# Patient Record
Sex: FEMALE | Race: BLACK OR AFRICAN AMERICAN | NOT HISPANIC OR LATINO | Employment: OTHER | URBAN - METROPOLITAN AREA
[De-identification: names, ages, dates, MRNs, and addresses within clinical notes are randomized per-mention and may not be internally consistent; named-entity substitution may affect disease eponyms.]

---

## 2018-01-30 ENCOUNTER — OFFICE VISIT (OUTPATIENT)
Dept: OBSTETRICS AND GYNECOLOGY | Facility: CLINIC | Age: 73
End: 2018-01-30

## 2018-01-30 VITALS
SYSTOLIC BLOOD PRESSURE: 122 MMHG | HEIGHT: 63 IN | BODY MASS INDEX: 27.64 KG/M2 | WEIGHT: 156 LBS | DIASTOLIC BLOOD PRESSURE: 70 MMHG

## 2018-01-30 DIAGNOSIS — Z01.419 WELL WOMAN EXAM WITH ROUTINE GYNECOLOGICAL EXAM: Primary | ICD-10-CM

## 2018-01-30 PROCEDURE — 99397 PER PM REEVAL EST PAT 65+ YR: CPT | Performed by: OBSTETRICS & GYNECOLOGY

## 2018-01-30 NOTE — PROGRESS NOTES
Subjective   Max Ortiz is a 72 y.o. female is here today as a self referral for annual.    History of Present Illness-here today for annual exam and checkup.    The following portions of the patient's history were reviewed and updated as appropriate: allergies, current medications, past family history, past medical history, past social history, past surgical history and problem list.    Review of Systems   Constitutional: Negative.    HENT: Negative.    Eyes: Negative.    Respiratory: Negative.    Cardiovascular: Negative.    Gastrointestinal: Negative.    Endocrine: Negative.    Genitourinary: Negative.    Musculoskeletal: Negative.    Skin: Negative.    Allergic/Immunologic: Negative.    Neurological: Negative.    Hematological: Negative.    Psychiatric/Behavioral: Negative.        Objective   Physical Exam   Constitutional: She is oriented to person, place, and time. She appears well-developed and well-nourished.   HENT:   Head: Normocephalic and atraumatic.   Nose: Nose normal.   Eyes: Conjunctivae and EOM are normal. Pupils are equal, round, and reactive to light.   Neck: Normal range of motion. Neck supple.   Cardiovascular: Normal rate, regular rhythm and normal heart sounds.    Pulmonary/Chest: Effort normal and breath sounds normal. Right breast exhibits no inverted nipple, no mass, no nipple discharge, no skin change and no tenderness. Left breast exhibits no inverted nipple, no mass, no nipple discharge and no skin change. Breasts are symmetrical. There is no breast swelling.   Abdominal: Soft. Hernia confirmed negative in the right inguinal area and confirmed negative in the left inguinal area.   Genitourinary: Rectum normal. No breast tenderness, discharge or bleeding. Pelvic exam was performed with patient supine. No labial fusion. There is no rash, tenderness, lesion or injury on the right labia. There is no rash, tenderness, lesion or injury on the left labia. Right adnexum displays no  mass, no tenderness and no fullness. Left adnexum displays no mass, no tenderness and no fullness. No erythema or bleeding in the vagina. No foreign body in the vagina. No signs of injury around the vagina. No vaginal discharge found.   Neurological: She is alert and oriented to person, place, and time. She has normal reflexes.   Skin: Skin is warm and dry.   Psychiatric: She has a normal mood and affect. Her behavior is normal. Judgment and thought content normal.         Assessment/Plan   Problems Addressed this Visit     None      Visit Diagnoses     Well woman exam with routine gynecological exam    -  Primary        Mammogram done today.  Current with colonoscopy.  Did receive flu vaccine and pneumonia vaccine.

## 2018-09-25 ENCOUNTER — TREATMENT (OUTPATIENT)
Dept: PHYSICAL THERAPY | Facility: CLINIC | Age: 73
End: 2018-09-25

## 2018-09-25 DIAGNOSIS — G89.29 CHRONIC LEFT SHOULDER PAIN: Primary | ICD-10-CM

## 2018-09-25 DIAGNOSIS — M25.512 CHRONIC LEFT SHOULDER PAIN: Primary | ICD-10-CM

## 2018-09-25 PROCEDURE — 97161 PT EVAL LOW COMPLEX 20 MIN: CPT | Performed by: PHYSICAL THERAPIST

## 2018-09-25 PROCEDURE — G8984 CARRY CURRENT STATUS: HCPCS | Performed by: PHYSICAL THERAPIST

## 2018-09-25 PROCEDURE — 97110 THERAPEUTIC EXERCISES: CPT | Performed by: PHYSICAL THERAPIST

## 2018-09-25 PROCEDURE — 97140 MANUAL THERAPY 1/> REGIONS: CPT | Performed by: PHYSICAL THERAPIST

## 2018-09-25 PROCEDURE — G8985 CARRY GOAL STATUS: HCPCS | Performed by: PHYSICAL THERAPIST

## 2018-09-26 ENCOUNTER — TRANSCRIBE ORDERS (OUTPATIENT)
Dept: PHYSICAL THERAPY | Facility: CLINIC | Age: 73
End: 2018-09-26

## 2018-09-26 DIAGNOSIS — M25.512 LEFT SHOULDER PAIN, UNSPECIFIED CHRONICITY: Primary | ICD-10-CM

## 2018-09-28 ENCOUNTER — TREATMENT (OUTPATIENT)
Dept: PHYSICAL THERAPY | Facility: CLINIC | Age: 73
End: 2018-09-28

## 2018-09-28 DIAGNOSIS — M25.512 LEFT SHOULDER PAIN, UNSPECIFIED CHRONICITY: ICD-10-CM

## 2018-09-28 DIAGNOSIS — M25.512 CHRONIC LEFT SHOULDER PAIN: Primary | ICD-10-CM

## 2018-09-28 DIAGNOSIS — G89.29 CHRONIC LEFT SHOULDER PAIN: Primary | ICD-10-CM

## 2018-09-28 PROCEDURE — 97140 MANUAL THERAPY 1/> REGIONS: CPT | Performed by: PHYSICAL THERAPIST

## 2018-09-28 PROCEDURE — 97110 THERAPEUTIC EXERCISES: CPT | Performed by: PHYSICAL THERAPIST

## 2018-09-28 NOTE — PROGRESS NOTES
Physical Therapy Daily Progress Note            Subjective Evaluation    History of Present Illness    Subjective comment: Patient report that her shoulder is still achy but it does feel a little better and does not feel as weak.  The exercises are going okay so far.        Objective   See Exercise, Manual, and Modality Logs for complete treatment.       Assessment & Plan     Assessment  Assessment details: Patient tolerated treatment very well. Improved passive ROM in left shoulder to 110 degrees flexion and 70 degrees ER.      Plan  Plan details: Progress as tolerated.                      Manual Therapy:    10     mins  50482;  Therapeutic Exercise:    40     mins  49840;       Timed Treatment:   50   mins   Total Treatment:     60   mins    Raysa Arredondo, PT  Physical Therapist

## 2018-10-02 ENCOUNTER — TREATMENT (OUTPATIENT)
Dept: PHYSICAL THERAPY | Facility: CLINIC | Age: 73
End: 2018-10-02

## 2018-10-02 DIAGNOSIS — G89.29 CHRONIC LEFT SHOULDER PAIN: Primary | ICD-10-CM

## 2018-10-02 DIAGNOSIS — M25.512 CHRONIC LEFT SHOULDER PAIN: Primary | ICD-10-CM

## 2018-10-02 PROCEDURE — 97110 THERAPEUTIC EXERCISES: CPT | Performed by: PHYSICAL THERAPIST

## 2018-10-02 PROCEDURE — 97140 MANUAL THERAPY 1/> REGIONS: CPT | Performed by: PHYSICAL THERAPIST

## 2018-10-02 NOTE — PROGRESS NOTES
Physical Therapy Daily Progress Note            Subjective Evaluation    History of Present Illness    Subjective comment: Patient reports that she is feeling better all the time and the exercises are going well at home. She states that she has more AROM of left shoudler and her arm does not feel as tired.        Objective   See Exercise, Manual, and Modality Logs for complete treatment.       Assessment & Plan     Assessment  Assessment details: Patient tolerated treatment well with improved AROM/PROM.  PROM to near  normal limites.  Active ROM  Limited to 90 degrees.  Still has upper trap tightness with elevated scapula.     Plan  Plan details: Progress rom and stength                     Manual Therapy:    10     mins  65051;  Therapeutic Exercise:    30     mins  32704;         Timed Treatment:   40   mins   Total Treatment:     40   mins    Raysa Arredondo, PT  Physical Therapist

## 2018-10-05 ENCOUNTER — TREATMENT (OUTPATIENT)
Dept: PHYSICAL THERAPY | Facility: CLINIC | Age: 73
End: 2018-10-05

## 2018-10-05 DIAGNOSIS — M25.512 CHRONIC LEFT SHOULDER PAIN: Primary | ICD-10-CM

## 2018-10-05 DIAGNOSIS — G89.29 CHRONIC LEFT SHOULDER PAIN: Primary | ICD-10-CM

## 2018-10-05 DIAGNOSIS — M25.512 LEFT SHOULDER PAIN, UNSPECIFIED CHRONICITY: ICD-10-CM

## 2018-10-05 PROCEDURE — 97110 THERAPEUTIC EXERCISES: CPT | Performed by: PHYSICAL THERAPIST

## 2018-10-05 PROCEDURE — 97140 MANUAL THERAPY 1/> REGIONS: CPT | Performed by: PHYSICAL THERAPIST

## 2018-10-05 NOTE — PROGRESS NOTES
Physical Therapy Daily Progress Note            Subjective Evaluation    History of Present Illness    Subjective comment: Patient reports that she is feeling approxinatly 75% better.  Is able to perform reaching activities around the house with greater ease.  She does not have as much weakness as she was having before.        Objective   See Exercise, Manual, and Modality Logs for complete treatment.       Assessment & Plan     Assessment  Assessment details: Patient tolerated treatment well.  She continues to show improvement with Left shoulder AROM.  She has 125 degrees flexion and 100 degrees abduction . She continues to have increased tightness along upper trap and levator as well as pectoral tightness.     Plan  Plan details: Progress one time a week for 2 weeks and then reasses.                      Manual Therapy:    8     mins  56397;  Therapeutic Exercise:    45     mins  08120;       Timed Treatment:   53   mins   Total Treatment:     53   mins    Raysa Arredondo, PT  Physical Therapist

## 2018-10-10 ENCOUNTER — TREATMENT (OUTPATIENT)
Dept: PHYSICAL THERAPY | Facility: CLINIC | Age: 73
End: 2018-10-10

## 2018-10-10 DIAGNOSIS — M25.512 CHRONIC LEFT SHOULDER PAIN: Primary | ICD-10-CM

## 2018-10-10 DIAGNOSIS — M25.512 LEFT SHOULDER PAIN, UNSPECIFIED CHRONICITY: ICD-10-CM

## 2018-10-10 DIAGNOSIS — G89.29 CHRONIC LEFT SHOULDER PAIN: Primary | ICD-10-CM

## 2018-10-10 PROCEDURE — 97140 MANUAL THERAPY 1/> REGIONS: CPT | Performed by: PHYSICAL THERAPIST

## 2018-10-10 PROCEDURE — 97110 THERAPEUTIC EXERCISES: CPT | Performed by: PHYSICAL THERAPIST

## 2018-10-10 NOTE — PROGRESS NOTES
Physical Therapy Daily Progress Note      Visit # 5      Subjective Evaluation    History of Present Illness    Subjective comment: Patient reports that her shoulder is feeling better.  has some increased pain in the right shoulder today.        Objective   See Exercise, Manual, and Modality Logs for complete treatment.       Assessment & Plan     Assessment  Assessment details: Patient tolerated treatment well.  She continues to have improved AROM/PROM and strength for left shoulder however still has very end range limitations which compromise reaching overhead.                      Manual Therapy:    10     mins  84323;  Therapeutic Exercise:    40     mins  82903;       Timed Treatment:   50   mins   Total Treatment:     50   mins    Raysa Arredondo, PT  Physical Therapist

## 2018-10-17 ENCOUNTER — TREATMENT (OUTPATIENT)
Dept: PHYSICAL THERAPY | Facility: CLINIC | Age: 73
End: 2018-10-17

## 2018-10-17 DIAGNOSIS — G89.29 CHRONIC LEFT SHOULDER PAIN: Primary | ICD-10-CM

## 2018-10-17 DIAGNOSIS — M25.512 CHRONIC LEFT SHOULDER PAIN: Primary | ICD-10-CM

## 2018-10-17 PROCEDURE — 97110 THERAPEUTIC EXERCISES: CPT | Performed by: PHYSICAL THERAPIST

## 2018-10-17 NOTE — PROGRESS NOTES
Physical Therapy Daily Progress Note      Visit # 6      Subjective Evaluation    History of Present Illness    Subjective comment: Patient reports that she is feelign approximately 85% better.  Has returned to all ADL without difficulty.        Objective   See Exercise, Manual, and Modality Logs for complete treatment.       Assessment & Plan     Assessment  Assessment details: Patient has full AROM of left shoulder.  Full 4+/5-5/5 strength throughout.  Patient is independent with HEP and has met all established goals at this time. Discharge to [facility].                      Manual Therapy:    5     mins  30154;  Therapeutic Exercise:    45     mins  35946;       Timed Treatment:   50   mins   Total Treatment:     50   mins    Raysa Arredondo, PT  Physical Therapist

## 2019-02-19 ENCOUNTER — OFFICE VISIT (OUTPATIENT)
Dept: OBSTETRICS AND GYNECOLOGY | Facility: CLINIC | Age: 74
End: 2019-02-19

## 2019-02-19 VITALS
BODY MASS INDEX: 27.43 KG/M2 | WEIGHT: 154.8 LBS | HEIGHT: 63 IN | SYSTOLIC BLOOD PRESSURE: 122 MMHG | DIASTOLIC BLOOD PRESSURE: 67 MMHG

## 2019-02-19 DIAGNOSIS — Z00.00 ANNUAL PHYSICAL EXAM: Primary | ICD-10-CM

## 2019-02-19 LAB
DEVELOPER EXPIRATION DATE: NORMAL
DEVELOPER LOT NUMBER: NORMAL
EXPIRATION DATE: NORMAL
FECAL OCCULT BLOOD SCREEN, POC: NEGATIVE
Lab: NORMAL
NEGATIVE CONTROL: NEGATIVE
POSITIVE CONTROL: POSITIVE

## 2019-02-19 PROCEDURE — 99397 PER PM REEVAL EST PAT 65+ YR: CPT | Performed by: OBSTETRICS & GYNECOLOGY

## 2019-02-19 PROCEDURE — G0328 FECAL BLOOD SCRN IMMUNOASSAY: HCPCS | Performed by: OBSTETRICS & GYNECOLOGY

## 2019-02-19 NOTE — PROGRESS NOTES
HPI   Max Ortiz  is a 73 y.o. female who presents for a routine gynecologic exam.  She is without complaints today.  She denies hot flashes or night sweats.  Bowels and bladder are functioning normally.    Chief Complaint   Patient presents with   • Gynecologic Exam     Patient is here for a annual.       Past Medical History:   Diagnosis Date   • Allergic    • Arthritis    • Cataract    • Hypertension    • Osteoporosis        Past Surgical History:   Procedure Laterality Date   • BREAST CYST EXCISION     • HYSTERECTOMY         Social History     Socioeconomic History   • Marital status:      Spouse name: Not on file   • Number of children: Not on file   • Years of education: Not on file   • Highest education level: Not on file   Social Needs   • Financial resource strain: Not on file   • Food insecurity - worry: Not on file   • Food insecurity - inability: Not on file   • Transportation needs - medical: Not on file   • Transportation needs - non-medical: Not on file   Occupational History   • Not on file   Tobacco Use   • Smoking status: Never Smoker   Substance and Sexual Activity   • Alcohol use: No   • Drug use: No   • Sexual activity: Not on file   Other Topics Concern   • Not on file   Social History Narrative   • Not on file       The following portions of the patient's history were reviewed and updated as appropriate: allergies, current medications, past family history, past medical history, past social history, past surgical history and problem list.    Review of Systems this is negative for hot flashes or night sweats.  It is negative for mood swings.  It is negative for urinary or fecal incontinence.  All other systems are reviewed and are negative.          Physical Exam   Constitutional: She is oriented to person, place, and time. She appears well-developed and well-nourished.   HENT:   Head: Normocephalic and atraumatic.   Cardiovascular: Normal rate and regular rhythm.    Pulmonary/Chest: Effort normal and breath sounds normal. She has no wheezes. She has no rales.   The breasts are homogeneous.  There are no palpable lumps.  Nipple discharge and axillary adenopathy are absent   Abdominal: Soft. She exhibits no distension. There is no tenderness.   Genitourinary: Rectum normal and vagina normal. Rectal exam shows guaiac negative stool. There is no lesion on the right labia. There is no lesion on the left labia. No vaginal discharge found.   Genitourinary Comments: The vagina is atrophic.  There is excellent support.  FOBT is negative.   Neurological: She is alert and oriented to person, place, and time.   Skin: Skin is warm and dry.   Nursing note and vitals reviewed.      Assessment    Max was seen today for gynecologic exam.    Diagnoses and all orders for this visit:    Annual physical exam        Plan       Return in about 1 year (around 2/19/2020).    Social History     Tobacco Use   Smoking Status Never Smoker   Normal gynecologic exam  Counseled regarding the importance of regular screening mammograms.  The patient plans to schedule a mammogram soon.  Counseled regarding the importance of regular screening colonoscopies.  Last colonoscopy was normal 6 years ago.  FOBT is negative today.    1.

## 2019-02-27 ENCOUNTER — PROCEDURE VISIT (OUTPATIENT)
Dept: OBSTETRICS AND GYNECOLOGY | Facility: CLINIC | Age: 74
End: 2019-02-27

## 2019-02-27 ENCOUNTER — APPOINTMENT (OUTPATIENT)
Dept: WOMENS IMAGING | Facility: HOSPITAL | Age: 74
End: 2019-02-27

## 2019-02-27 DIAGNOSIS — Z12.31 VISIT FOR SCREENING MAMMOGRAM: Primary | ICD-10-CM

## 2019-02-27 PROCEDURE — 77067 SCR MAMMO BI INCL CAD: CPT | Performed by: OBSTETRICS & GYNECOLOGY

## 2019-02-27 PROCEDURE — 77067 SCR MAMMO BI INCL CAD: CPT | Performed by: RADIOLOGY

## 2020-02-23 ENCOUNTER — HOSPITAL ENCOUNTER (EMERGENCY)
Facility: HOSPITAL | Age: 75
Discharge: HOME | End: 2020-02-23
Attending: EMERGENCY MEDICINE | Admitting: EMERGENCY MEDICINE

## 2020-02-23 ENCOUNTER — APPOINTMENT (OUTPATIENT)
Dept: GENERAL RADIOLOGY | Facility: HOSPITAL | Age: 75
End: 2020-02-23

## 2020-02-23 VITALS
HEIGHT: 63 IN | WEIGHT: 148 LBS | OXYGEN SATURATION: 100 % | RESPIRATION RATE: 16 BRPM | DIASTOLIC BLOOD PRESSURE: 93 MMHG | HEART RATE: 106 BPM | TEMPERATURE: 98.9 F | SYSTOLIC BLOOD PRESSURE: 134 MMHG | BODY MASS INDEX: 26.22 KG/M2

## 2020-02-23 DIAGNOSIS — R07.89 ATYPICAL CHEST PAIN: Primary | ICD-10-CM

## 2020-02-23 DIAGNOSIS — M79.602 LEFT ARM PAIN: ICD-10-CM

## 2020-02-23 LAB
ALBUMIN SERPL-MCNC: 4.6 G/DL (ref 3.5–5.2)
ALBUMIN/GLOB SERPL: 1.4 G/DL
ALP SERPL-CCNC: 92 U/L (ref 39–117)
ALT SERPL W P-5'-P-CCNC: 12 U/L (ref 1–33)
ANION GAP SERPL CALCULATED.3IONS-SCNC: 13.8 MMOL/L (ref 5–15)
AST SERPL-CCNC: 14 U/L (ref 1–32)
BASOPHILS # BLD AUTO: 0.04 10*3/MM3 (ref 0–0.2)
BASOPHILS NFR BLD AUTO: 0.7 % (ref 0–1.5)
BILIRUB SERPL-MCNC: 0.4 MG/DL (ref 0.2–1.2)
BUN BLD-MCNC: 16 MG/DL (ref 8–23)
BUN/CREAT SERPL: 15.8 (ref 7–25)
CALCIUM SPEC-SCNC: 10.1 MG/DL (ref 8.6–10.5)
CHLORIDE SERPL-SCNC: 105 MMOL/L (ref 98–107)
CO2 SERPL-SCNC: 24.2 MMOL/L (ref 22–29)
CREAT BLD-MCNC: 1.01 MG/DL (ref 0.57–1)
DEPRECATED RDW RBC AUTO: 40.9 FL (ref 37–54)
EOSINOPHIL # BLD AUTO: 0.02 10*3/MM3 (ref 0–0.4)
EOSINOPHIL NFR BLD AUTO: 0.4 % (ref 0.3–6.2)
ERYTHROCYTE [DISTWIDTH] IN BLOOD BY AUTOMATED COUNT: 11.8 % (ref 12.3–15.4)
GFR SERPL CREATININE-BSD FRML MDRD: 65 ML/MIN/1.73
GLOBULIN UR ELPH-MCNC: 3.3 GM/DL
GLUCOSE BLD-MCNC: 103 MG/DL (ref 65–99)
HCT VFR BLD AUTO: 33.3 % (ref 34–46.6)
HGB BLD-MCNC: 10.7 G/DL (ref 12–15.9)
IMM GRANULOCYTES # BLD AUTO: 0.01 10*3/MM3 (ref 0–0.05)
IMM GRANULOCYTES NFR BLD AUTO: 0.2 % (ref 0–0.5)
LYMPHOCYTES # BLD AUTO: 1.21 10*3/MM3 (ref 0.7–3.1)
LYMPHOCYTES NFR BLD AUTO: 21.5 % (ref 19.6–45.3)
MCH RBC QN AUTO: 30.3 PG (ref 26.6–33)
MCHC RBC AUTO-ENTMCNC: 32.1 G/DL (ref 31.5–35.7)
MCV RBC AUTO: 94.3 FL (ref 79–97)
MONOCYTES # BLD AUTO: 0.47 10*3/MM3 (ref 0.1–0.9)
MONOCYTES NFR BLD AUTO: 8.3 % (ref 5–12)
NEUTROPHILS # BLD AUTO: 3.89 10*3/MM3 (ref 1.7–7)
NEUTROPHILS NFR BLD AUTO: 68.9 % (ref 42.7–76)
NRBC BLD AUTO-RTO: 0 /100 WBC (ref 0–0.2)
PLATELET # BLD AUTO: 229 10*3/MM3 (ref 140–450)
PMV BLD AUTO: 9.9 FL (ref 6–12)
POTASSIUM BLD-SCNC: 4 MMOL/L (ref 3.5–5.2)
PROT SERPL-MCNC: 7.9 G/DL (ref 6–8.5)
RBC # BLD AUTO: 3.53 10*6/MM3 (ref 3.77–5.28)
SODIUM BLD-SCNC: 143 MMOL/L (ref 136–145)
TROPONIN T SERPL-MCNC: <0.01 NG/ML (ref 0–0.03)
TROPONIN T SERPL-MCNC: <0.01 NG/ML (ref 0–0.03)
WBC NRBC COR # BLD: 5.64 10*3/MM3 (ref 3.4–10.8)

## 2020-02-23 PROCEDURE — 84484 ASSAY OF TROPONIN QUANT: CPT | Performed by: NURSE PRACTITIONER

## 2020-02-23 PROCEDURE — 99284 EMERGENCY DEPT VISIT MOD MDM: CPT

## 2020-02-23 PROCEDURE — 93010 ELECTROCARDIOGRAM REPORT: CPT | Performed by: INTERNAL MEDICINE

## 2020-02-23 PROCEDURE — 80053 COMPREHEN METABOLIC PANEL: CPT | Performed by: NURSE PRACTITIONER

## 2020-02-23 PROCEDURE — 85025 COMPLETE CBC W/AUTO DIFF WBC: CPT | Performed by: NURSE PRACTITIONER

## 2020-02-23 PROCEDURE — 71046 X-RAY EXAM CHEST 2 VIEWS: CPT

## 2020-02-23 PROCEDURE — 93005 ELECTROCARDIOGRAM TRACING: CPT | Performed by: NURSE PRACTITIONER

## 2020-02-23 RX ORDER — SODIUM CHLORIDE 0.9 % (FLUSH) 0.9 %
10 SYRINGE (ML) INJECTION AS NEEDED
Status: DISCONTINUED | OUTPATIENT
Start: 2020-02-23 | End: 2020-02-23 | Stop reason: HOSPADM

## 2020-02-23 NOTE — ED NOTES
"Pt to ER via EMS for left arm pain that started about one hour ago when she heard a \"pop\" in her house and then smelled gas. Pt reports she became anxious thinking there was a gas leak and that's when pain started. Pt denies shortness of breath or chest pain.      Ena Rader, RN  02/23/20 0948    "

## 2020-02-23 NOTE — ED PROVIDER NOTES
MD ATTESTATION NOTE    Patient presents to the ED complaining of episode of left arm pain starting after pt reports having a gas leak in her house, lasting 45 minutes. Also c/o anxiety during this episode.    Reviewed patient's lab and imaging studies, including negative acute EKG, XR Chest, CMP, and initial Troponin.     EKG          EKG time: 1125  Rhythm/Rate: NSR 77  P waves and VA: nml  QRS, axis: LAE   ST and T waves: nml     Interpreted Contemporaneously by me, independently viewed  No prior for comparison    On exam:    GENERAL: not distressed, awake, alert  CV: regular rhythm, regular rate  RESPIRATORY: normal effort  MUSCULOSKELETAL: no deformity, nml L radial pulse, no LUE edema  NEURO: alert, MATHUR, FC  SKIN: warm, dry    I agree with the plan to obtain second Troponin to rule out cardiac cause of pain then discharge pt home if negative.    The YULY and I have discussed this patient's history, physical exam, and treatment plan.  I have reviewed the documentation and personally had a face to face interaction with the patient. I affirm the documentation and agree with the treatment and plan.  The attached note describes my personal findings.    Documentation assistance provided by magdaleno Ac for Dr. Rodriguez.  Information recorded by the scribe was done at my direction and has been verified and validated by me.     Daly Ac  02/23/20 1218       Larry Rodriguez MD  02/23/20 5751

## 2020-02-23 NOTE — ED PROVIDER NOTES
"  EMERGENCY DEPARTMENT ENCOUNTER    CHIEF COMPLAINT  Chief Complaint: Arm pain  History given by: Patient  History limited by: None  Time Seen: 1019  Room Number: 31/31  PMD: Chintan Townsend MD      HPI:  Pt is a 74 y.o. female who presents with left arm pain that began earlier this morning around 0830 when the patient's home had a gas leak. Patient states her arm felt like it had a band around it. Patient states \"I don't know if I had a heart attack.\" Patient states she became excited and anxious when her arm pain began. Patient reports her arm pain has improved. Patient denies any chest pain lightheadedness, dizziness, coughing, or hx of smoking. Patient reports she has family in [place] she can stay with in the meantime while the gas leak is being fixed.   Past Medical History of HTN. Patient denies cardiac hx.     Duration: 0830 this morning  Timing: constant  Location: left arm  Radiation: none  Quality: band around my arm  Intensity/Severity: moderate  Progression: improved  Associated Symptoms: anxious  Aggravating Factors: none  Alleviating Factors: none  Previous Episodes: none  Treatment before arrival: no    PAST MEDICAL HISTORY  Active Ambulatory Problems     Diagnosis Date Noted   • No Active Ambulatory Problems     Resolved Ambulatory Problems     Diagnosis Date Noted   • No Resolved Ambulatory Problems     Past Medical History:   Diagnosis Date   • Allergic    • Arthritis    • Cataract    • Hypertension    • Osteoporosis        PAST SURGICAL HISTORY  Past Surgical History:   Procedure Laterality Date   • BREAST CYST EXCISION     • HYSTERECTOMY         FAMILY HISTORY  Family History   Problem Relation Age of Onset   • Hypertension Father    • Diabetes Father    • Colon cancer Father    • Alzheimer's disease Mother    • Hypertension Mother    • Heart disease Mother    • Colon cancer Paternal Aunt    • Breast cancer Cousin        SOCIAL HISTORY  Social History     Socioeconomic History   • Marital " status:      Spouse name: Not on file   • Number of children: Not on file   • Years of education: Not on file   • Highest education level: Not on file   Tobacco Use   • Smoking status: Never Smoker   Substance and Sexual Activity   • Alcohol use: No   • Drug use: No         ALLERGIES  Aspirin and Penicillins    REVIEW OF SYSTEMS  Review of Systems   Constitutional: Negative for chills and fever.   HENT: Negative for sore throat.    Respiratory: Negative for shortness of breath.    Cardiovascular: Negative for chest pain.   Gastrointestinal: Negative for nausea and vomiting.   Genitourinary: Negative for dysuria.   Musculoskeletal: Negative for back pain.        + arm pain   Skin: Negative for rash.   Neurological: Negative for dizziness.   Psychiatric/Behavioral: The patient is not nervous/anxious.        PHYSICAL EXAM  ED Triage Vitals   Temp Heart Rate Resp BP SpO2   02/23/20 1008 02/23/20 0948 02/23/20 0948 02/23/20 0948 02/23/20 0948   98.9 °F (37.2 °C) 106 16 153/82 100 %       Physical Exam   Constitutional: She is oriented to person, place, and time and well-developed, well-nourished, and in no distress.   HENT:   Head: Normocephalic and atraumatic.   Mouth/Throat: Mucous membranes are normal.   Eyes: No scleral icterus.   Neck: Normal range of motion.   Cardiovascular: Normal rate, regular rhythm and normal heart sounds.   Pulmonary/Chest: Effort normal and breath sounds normal.   Musculoskeletal: Normal range of motion.   Neurological: She is alert and oriented to person, place, and time.   Skin: Skin is warm and dry.   Psychiatric: Mood and affect normal.   Nursing note and vitals reviewed.      LAB RESULTS  Recent Results (from the past 24 hour(s))   Comprehensive Metabolic Panel    Collection Time: 02/23/20 10:53 AM   Result Value Ref Range    Glucose 103 (H) 65 - 99 mg/dL    BUN 16 8 - 23 mg/dL    Creatinine 1.01 (H) 0.57 - 1.00 mg/dL    Sodium 143 136 - 145 mmol/L    Potassium 4.0 3.5 - 5.2  mmol/L    Chloride 105 98 - 107 mmol/L    CO2 24.2 22.0 - 29.0 mmol/L    Calcium 10.1 8.6 - 10.5 mg/dL    Total Protein 7.9 6.0 - 8.5 g/dL    Albumin 4.60 3.50 - 5.20 g/dL    ALT (SGPT) 12 1 - 33 U/L    AST (SGOT) 14 1 - 32 U/L    Alkaline Phosphatase 92 39 - 117 U/L    Total Bilirubin 0.4 0.2 - 1.2 mg/dL    eGFR  African Amer 65 >60 mL/min/1.73    Globulin 3.3 gm/dL    A/G Ratio 1.4 g/dL    BUN/Creatinine Ratio 15.8 7.0 - 25.0    Anion Gap 13.8 5.0 - 15.0 mmol/L   Troponin    Collection Time: 02/23/20 10:53 AM   Result Value Ref Range    Troponin T <0.010 0.000 - 0.030 ng/mL   CBC Auto Differential    Collection Time: 02/23/20 10:53 AM   Result Value Ref Range    WBC 5.64 3.40 - 10.80 10*3/mm3    RBC 3.53 (L) 3.77 - 5.28 10*6/mm3    Hemoglobin 10.7 (L) 12.0 - 15.9 g/dL    Hematocrit 33.3 (L) 34.0 - 46.6 %    MCV 94.3 79.0 - 97.0 fL    MCH 30.3 26.6 - 33.0 pg    MCHC 32.1 31.5 - 35.7 g/dL    RDW 11.8 (L) 12.3 - 15.4 %    RDW-SD 40.9 37.0 - 54.0 fl    MPV 9.9 6.0 - 12.0 fL    Platelets 229 140 - 450 10*3/mm3    Neutrophil % 68.9 42.7 - 76.0 %    Lymphocyte % 21.5 19.6 - 45.3 %    Monocyte % 8.3 5.0 - 12.0 %    Eosinophil % 0.4 0.3 - 6.2 %    Basophil % 0.7 0.0 - 1.5 %    Immature Grans % 0.2 0.0 - 0.5 %    Neutrophils, Absolute 3.89 1.70 - 7.00 10*3/mm3    Lymphocytes, Absolute 1.21 0.70 - 3.10 10*3/mm3    Monocytes, Absolute 0.47 0.10 - 0.90 10*3/mm3    Eosinophils, Absolute 0.02 0.00 - 0.40 10*3/mm3    Basophils, Absolute 0.04 0.00 - 0.20 10*3/mm3    Immature Grans, Absolute 0.01 0.00 - 0.05 10*3/mm3    nRBC 0.0 0.0 - 0.2 /100 WBC   Troponin    Collection Time: 02/23/20 12:30 PM   Result Value Ref Range    Troponin T <0.010 0.000 - 0.030 ng/mL       I ordered the above labs and reviewed the results    RADIOLOGY  XR Chest 2 View   Final Result      Xr Chest 2 View    Result Date: 2/23/2020  Narrative: TWO-VIEW CHEST  HISTORY: Left-sided pain extending into arm.  FINDINGS:  The lungs are well-expanded and clear and  "the heart and hilar structures are normal. There is no acute disease.  This report was finalized on 2/23/2020 11:46 AM by Dr. Marco A Rader M.D.      I ordered the above noted radiological studies and reviewed the images on the PACS system.      EKG    ekg was interpreted by Dr. Rodriguez, see Dr. Rodriguez's note for interpretation.      PROGRESS AND CONSULTS     Reviewed pt's history and workup with Dr. Rodriguez.  At bedside evaluation, they agree with the plan of care.    Reviewed implications of results, diagnosis, meds, responsibility to follow up, warning signs and symptoms of possible worsening, potential complications and reasons to return to ER with patient.  Discussed all results and noted any abnormalities with patient.  Discussed absolute need to recheck abnormalities with PCP.    Discussed plan for discharge, as there is no emergent indication for admission.  Pt is agreeable and understands need for follow up and repeat testing.  Pt is aware that discharge does not mean that nothing is wrong but it indicates no emergency is present.  Pt is discharged with instructions to follow up with primary care doctor to have their blood pressure rechecked.       DIAGNOSIS  Final diagnoses:   Atypical chest pain   Left arm pain       FOLLOW UP   Chintan Townsend MD  [address] [address]  [phone]          COURSE & MEDICAL DECISION MAKING  Pertinent Labs and Imaging studies that were ordered and reviewed are noted above.  Results were reviewed/discussed with the patient and they were also made aware of online assess.   Pt also made aware that some labs, such as cultures, will not be resulted during ER visit and follow up with PMD is necessary.     MEDICATIONS GIVEN IN ER  Medications   sodium chloride 0.9 % flush 10 mL (has no administration in time range)       /82   Pulse 106   Temp 98.9 °F (37.2 °C)   Resp 16   Ht 160 cm (63\")   Wt 67.1 kg (148 lb)   SpO2 100%   BMI 26.22 kg/m² "       I personally reviewed the past medical history, past surgical history, social history, family history, current medications and allergies as they appear in this chart.  The scribe's note accurately reflects the work and decisions made by me.     Documentation assistance provided by magdaleno Carroll for DC Egan on 2/23/2020 at 1:13 PM. Information recorded by the scribe was done at my direction and has been verified and validated by me.       Sabrina Carroll  02/23/20 8635       Katharina Devine, DC  02/23/20 9596

## 2020-02-23 NOTE — DISCHARGE INSTRUCTIONS
Continue current home medications  Follow-up with your PMD next week as needed  Return to the ER for fever, chills, chest pain, shortness of breath or any new or worsening symptoms

## 2020-02-28 ENCOUNTER — PROCEDURE VISIT (OUTPATIENT)
Dept: OBSTETRICS AND GYNECOLOGY | Facility: CLINIC | Age: 75
End: 2020-02-28

## 2020-02-28 ENCOUNTER — APPOINTMENT (OUTPATIENT)
Dept: WOMENS IMAGING | Facility: HOSPITAL | Age: 75
End: 2020-02-28

## 2020-02-28 DIAGNOSIS — Z12.31 VISIT FOR SCREENING MAMMOGRAM: Primary | ICD-10-CM

## 2020-02-28 DIAGNOSIS — Z78.0 POST-MENOPAUSAL: Primary | ICD-10-CM

## 2020-02-28 PROCEDURE — 77067 SCR MAMMO BI INCL CAD: CPT | Performed by: RADIOLOGY

## 2020-02-28 PROCEDURE — 77067 SCR MAMMO BI INCL CAD: CPT | Performed by: OBSTETRICS & GYNECOLOGY

## 2020-02-28 PROCEDURE — 77063 BREAST TOMOSYNTHESIS BI: CPT | Performed by: RADIOLOGY

## 2020-02-28 PROCEDURE — 77063 BREAST TOMOSYNTHESIS BI: CPT | Performed by: OBSTETRICS & GYNECOLOGY

## 2020-06-10 ENCOUNTER — APPOINTMENT (OUTPATIENT)
Dept: WOMENS IMAGING | Facility: HOSPITAL | Age: 75
End: 2020-06-10

## 2020-06-10 PROCEDURE — 77080 DXA BONE DENSITY AXIAL: CPT | Performed by: RADIOLOGY

## 2020-06-17 DIAGNOSIS — Z78.0 POST-MENOPAUSAL: ICD-10-CM

## 2020-08-26 DIAGNOSIS — I10 ESSENTIAL HYPERTENSION, BENIGN: Primary | ICD-10-CM

## 2020-08-26 NOTE — TELEPHONE ENCOUNTER
Caller: Max Wasserman    Relationship: Self    Best call back number: [phone]    Medication needed:   Requested Prescriptions     Pending Prescriptions Disp Refills   • hydroCHLOROthiazide (HYDRODIURIL) 25 MG tablet       Si tablet.       When do you need the refill by: ASAP    What details did the patient provide when requesting the medication: PT STATES SHE WILL BE OUT ON  FRIDAY    Does the patient have less than a 3 day supply:  [] Yes  [x] No    What is the patient's preferred pharmacy: [address] ([facility] [address] [facility] [phone] [facility] [phone]

## 2020-08-27 DIAGNOSIS — I10 ESSENTIAL HYPERTENSION, BENIGN: Primary | ICD-10-CM

## 2020-08-27 RX ORDER — HYDRALAZINE HYDROCHLORIDE 25 MG/1
25 TABLET, FILM COATED ORAL 2 TIMES DAILY
Qty: 60 TABLET | Refills: 0 | Status: SHIPPED | OUTPATIENT
Start: 2020-08-27 | End: 2021-09-21 | Stop reason: SDUPTHER

## 2020-08-27 RX ORDER — HYDROCHLOROTHIAZIDE 25 MG/1
25 TABLET ORAL DAILY
Qty: 30 TABLET | Refills: 1 | Status: SHIPPED | OUTPATIENT
Start: 2020-08-27 | End: 2020-09-16

## 2020-09-16 ENCOUNTER — OFFICE VISIT (OUTPATIENT)
Dept: FAMILY MEDICINE CLINIC | Facility: CLINIC | Age: 75
End: 2020-09-16

## 2020-09-16 VITALS
DIASTOLIC BLOOD PRESSURE: 88 MMHG | HEART RATE: 85 BPM | SYSTOLIC BLOOD PRESSURE: 160 MMHG | HEIGHT: 63 IN | WEIGHT: 145.4 LBS | BODY MASS INDEX: 25.76 KG/M2 | TEMPERATURE: 98.4 F | RESPIRATION RATE: 16 BRPM | OXYGEN SATURATION: 100 %

## 2020-09-16 DIAGNOSIS — M75.82 ROTATOR CUFF TENDINITIS, LEFT: ICD-10-CM

## 2020-09-16 DIAGNOSIS — I10 ESSENTIAL HYPERTENSION, BENIGN: Primary | ICD-10-CM

## 2020-09-16 LAB
ALBUMIN SERPL-MCNC: 4.8 G/DL (ref 3.5–5.2)
ALBUMIN/GLOB SERPL: 1.7 G/DL
ALP SERPL-CCNC: 98 U/L (ref 39–117)
ALT SERPL-CCNC: 15 U/L (ref 1–33)
AST SERPL-CCNC: 15 U/L (ref 1–32)
BILIRUB SERPL-MCNC: 0.3 MG/DL (ref 0–1.2)
BUN SERPL-MCNC: 16 MG/DL (ref 8–23)
BUN/CREAT SERPL: 14 (ref 7–25)
CALCIUM SERPL-MCNC: 10.1 MG/DL (ref 8.6–10.5)
CHLORIDE SERPL-SCNC: 102 MMOL/L (ref 98–107)
CO2 SERPL-SCNC: 28.4 MMOL/L (ref 22–29)
CREAT SERPL-MCNC: 1.14 MG/DL (ref 0.57–1)
ERYTHROCYTE [DISTWIDTH] IN BLOOD BY AUTOMATED COUNT: 11.6 % (ref 12.3–15.4)
GLOBULIN SER CALC-MCNC: 2.8 GM/DL
GLUCOSE SERPL-MCNC: 104 MG/DL (ref 65–99)
HCT VFR BLD AUTO: 32.8 % (ref 34–46.6)
HGB BLD-MCNC: 10.9 G/DL (ref 12–15.9)
MCH RBC QN AUTO: 30.5 PG (ref 26.6–33)
MCHC RBC AUTO-ENTMCNC: 33.2 G/DL (ref 31.5–35.7)
MCV RBC AUTO: 91.9 FL (ref 79–97)
PLATELET # BLD AUTO: 245 10*3/MM3 (ref 140–450)
POTASSIUM SERPL-SCNC: 4 MMOL/L (ref 3.5–5.2)
PROT SERPL-MCNC: 7.6 G/DL (ref 6–8.5)
RBC # BLD AUTO: 3.57 10*6/MM3 (ref 3.77–5.28)
SODIUM SERPL-SCNC: 141 MMOL/L (ref 136–145)
WBC # BLD AUTO: 6.02 10*3/MM3 (ref 3.4–10.8)

## 2020-09-16 PROCEDURE — 99204 OFFICE O/P NEW MOD 45 MIN: CPT | Performed by: INTERNAL MEDICINE

## 2020-09-16 RX ORDER — TRIPROLIDINE/PSEUDOEPHEDRINE 2.5MG-60MG
1 TABLET ORAL 4 TIMES DAILY
COMMUNITY
End: 2021-11-08

## 2020-09-16 NOTE — PROGRESS NOTES
09/16/2020    CC: Hypertension  .        HPI  Hypertension  This is a chronic problem. The problem has been gradually improving since onset. The problem is controlled.        Subjective   Max Wasserman is a 74 y.o. female.      The following portions of the patient's history were reviewed and updated as appropriate: allergies, current medications, past family history, past medical history, past social history, past surgical history and problem list.    Problem List  There is no problem list on file for this patient.      Past Medical History  Past Medical History:   Diagnosis Date   • Allergic    • Arthritis    • Cataract    • Hypertension    • Osteoporosis        Surgical History  Past Surgical History:   Procedure Laterality Date   • BREAST CYST EXCISION     • HYSTERECTOMY         Family History  Family History   Problem Relation Age of Onset   • Hypertension Father    • Diabetes Father    • Colon cancer Father    • Alzheimer's disease Mother    • Hypertension Mother    • Heart disease Mother    • Colon cancer Paternal Aunt    • Breast cancer Cousin        Social History  Social History    Tobacco Use      Smoking status: Never Smoker      Smokeless tobacco: Never Used       Is the Patient a current tobacco user? No    Allergies  Allergies   Allergen Reactions   • Aspirin Other (See Comments)     UPSETS STOMACH   • Penicillins Other (See Comments)     RED AND SWOLLEN AT INJECTION SITE       Current Medications    Current Outpatient Medications:   •  amLODIPine (NORVASC) 10 MG tablet, Take 10 mg by mouth., Disp: , Rfl:   •  difluprednate (Durezol) 0.05 % ophthalmic emulsion, 1 drop 4 (Four) Times a Day., Disp: , Rfl:   •  hydrALAZINE (APRESOLINE) 25 MG tablet, Take 1 tablet by mouth 2 (Two) Times a Day., Disp: 60 tablet, Rfl: 0  •  lisinopril (PRINIVIL,ZESTRIL) 40 MG tablet, Take 40 mg by mouth., Disp: , Rfl:   •  hydroCHLOROthiazide (HYDRODIURIL) 25 MG tablet, Take 1 tablet by mouth Daily., Disp: 30 tablet,  Rfl: 1     Review of System  Review of Systems   Constitutional: Negative.    HENT: Negative.    Eyes: Negative.    Respiratory: Negative.    Gastrointestinal: Negative.    Musculoskeletal: Negative.    Skin: Negative.      I have reviewed and confirmed the accuracy of the ROS as documented by the MA/LPN/RN Chintan Townsend MD    Vitals:    09/16/20 1013   BP: 160/88   Pulse: 85   Resp: 16   Temp: 98.4 °F (36.9 °C)   SpO2: 100%     Body mass index is 26.17 kg/m².    Objective     No visits with results within 30 Day(s) from this visit.   Latest known visit with results is:   Admission on 02/23/2020, Discharged on 02/23/2020   Component Date Value Ref Range Status   • Glucose 02/23/2020 103* 65 - 99 mg/dL Final   • BUN 02/23/2020 16  8 - 23 mg/dL Final   • Creatinine 02/23/2020 1.01* 0.57 - 1.00 mg/dL Final   • Sodium 02/23/2020 143  136 - 145 mmol/L Final   • Potassium 02/23/2020 4.0  3.5 - 5.2 mmol/L Final   • Chloride 02/23/2020 105  98 - 107 mmol/L Final   • CO2 02/23/2020 24.2  22.0 - 29.0 mmol/L Final   • Calcium 02/23/2020 10.1  8.6 - 10.5 mg/dL Final   • Total Protein 02/23/2020 7.9  6.0 - 8.5 g/dL Final   • Albumin 02/23/2020 4.60  3.50 - 5.20 g/dL Final   • ALT (SGPT) 02/23/2020 12  1 - 33 U/L Final   • AST (SGOT) 02/23/2020 14  1 - 32 U/L Final   • Alkaline Phosphatase 02/23/2020 92  39 - 117 U/L Final   • Total Bilirubin 02/23/2020 0.4  0.2 - 1.2 mg/dL Final   • eGFR   Amer 02/23/2020 65  >60 mL/min/1.73 Final   • Globulin 02/23/2020 3.3  gm/dL Final   • A/G Ratio 02/23/2020 1.4  g/dL Final   • BUN/Creatinine Ratio 02/23/2020 15.8  7.0 - 25.0 Final   • Anion Gap 02/23/2020 13.8  5.0 - 15.0 mmol/L Final   • Troponin T 02/23/2020 <0.010  0.000 - 0.030 ng/mL Final   • WBC 02/23/2020 5.64  3.40 - 10.80 10*3/mm3 Final   • RBC 02/23/2020 3.53* 3.77 - 5.28 10*6/mm3 Final   • Hemoglobin 02/23/2020 10.7* 12.0 - 15.9 g/dL Final   • Hematocrit 02/23/2020 33.3* 34.0 - 46.6 % Final   • MCV 02/23/2020 94.3   79.0 - 97.0 fL Final   • MCH 02/23/2020 30.3  26.6 - 33.0 pg Final   • MCHC 02/23/2020 32.1  31.5 - 35.7 g/dL Final   • RDW 02/23/2020 11.8* 12.3 - 15.4 % Final   • RDW-SD 02/23/2020 40.9  37.0 - 54.0 fl Final   • MPV 02/23/2020 9.9  6.0 - 12.0 fL Final   • Platelets 02/23/2020 229  140 - 450 10*3/mm3 Final   • Neutrophil % 02/23/2020 68.9  42.7 - 76.0 % Final   • Lymphocyte % 02/23/2020 21.5  19.6 - 45.3 % Final   • Monocyte % 02/23/2020 8.3  5.0 - 12.0 % Final   • Eosinophil % 02/23/2020 0.4  0.3 - 6.2 % Final   • Basophil % 02/23/2020 0.7  0.0 - 1.5 % Final   • Immature Grans % 02/23/2020 0.2  0.0 - 0.5 % Final   • Neutrophils, Absolute 02/23/2020 3.89  1.70 - 7.00 10*3/mm3 Final   • Lymphocytes, Absolute 02/23/2020 1.21  0.70 - 3.10 10*3/mm3 Final   • Monocytes, Absolute 02/23/2020 0.47  0.10 - 0.90 10*3/mm3 Final   • Eosinophils, Absolute 02/23/2020 0.02  0.00 - 0.40 10*3/mm3 Final   • Basophils, Absolute 02/23/2020 0.04  0.00 - 0.20 10*3/mm3 Final   • Immature Grans, Absolute 02/23/2020 0.01  0.00 - 0.05 10*3/mm3 Final   • nRBC 02/23/2020 0.0  0.0 - 0.2 /100 WBC Final   • Troponin T 02/23/2020 <0.010  0.000 - 0.030 ng/mL Final       Physical Exam  Physical Exam  Vitals signs and nursing note reviewed.   Constitutional:       Appearance: She is well-developed.   Eyes:      Conjunctiva/sclera: Conjunctivae normal.   Neck:      Musculoskeletal: Normal range of motion and neck supple.   Cardiovascular:      Rate and Rhythm: Normal rate and regular rhythm.      Heart sounds: Normal heart sounds.   Pulmonary:      Effort: Pulmonary effort is normal.   Abdominal:      General: Bowel sounds are normal.      Palpations: Abdomen is soft.   Skin:     General: Skin is warm and dry.   Neurological:      Mental Status: She is alert and oriented to person, place, and time.   Psychiatric:         Behavior: Behavior normal.         Assessment/Plan      This patient then lost to follow-up for about 5 months.  This was secondary  to the Covid 19 pandemic.  She relates that upon her last refill she was given HCTZ she knew that we discontinue this in the past and refused to take it.  She relates that she is feeling fine she's had no problems in the interim of visits.    Recheck of her blood pressure by me in the left arm sitting position standard cuff was 1 30/4/80. She denies any swelling in the lower extremities.n patient's blood pressures been difficult to control but it is holding at this point on 3 agents.      Patient's range of motion in left shoulder is within normal limits at this point.  The rotator cuff tendinitis she had previously has pretty much resolved.  Patient wrote relates that she is able to perform most of her usual activities without any deficit.    Max was seen today for hypertension.    Diagnoses and all orders for this visit:    Essential hypertension, benign: Controlled  -     Comprehensive metabolic panel  -     CBC No Differential    Rotator cuff tendinitis, left: Stable      Plan:  #1 per) continue current medication  #2.)  Follow-up in 5-6 months for a Medicare wellness review  #3.) comprehensive metabolic panel  #4.)  Patient is instructed not to take HCTZ       Chintan Townsend MD  09/16/2020

## 2020-09-29 ENCOUNTER — OFFICE VISIT (OUTPATIENT)
Dept: OBSTETRICS AND GYNECOLOGY | Facility: CLINIC | Age: 75
End: 2020-09-29

## 2020-09-29 VITALS — BODY MASS INDEX: 27.2 KG/M2 | HEIGHT: 62 IN | WEIGHT: 147.8 LBS

## 2020-09-29 DIAGNOSIS — M85.80 OSTEOPENIA, UNSPECIFIED LOCATION: Primary | ICD-10-CM

## 2020-09-29 PROCEDURE — 99212 OFFICE O/P EST SF 10 MIN: CPT | Performed by: OBSTETRICS & GYNECOLOGY

## 2020-09-29 NOTE — PROGRESS NOTES
HPI   Max Shirin Bonds  is a 74 y.o. female who presents to discuss her bone density.  She had a DEXA in February of this year.  I have reviewed it and discussed it with the patient.  It shows a T score of -1.3 at the spine.  There is an overall T score of -0.9 at the hip, with focal areas of -1.3 at the femoral neck.  The patient does not take calcium.  She does take vitamin D and does weightbearing exercise.    Chief Complaint   Patient presents with   • Follow-up     Patient is here for a f/u to discuss bone density results.       Past Medical History:   Diagnosis Date   • Allergic    • Arthritis    • Cataract    • Hypertension    • Osteoporosis        Past Surgical History:   Procedure Laterality Date   • BREAST CYST EXCISION     • HYSTERECTOMY         Social History     Socioeconomic History   • Marital status:      Spouse name: Not on file   • Number of children: Not on file   • Years of education: Not on file   • Highest education level: Not on file   Tobacco Use   • Smoking status: Never Smoker   • Smokeless tobacco: Never Used   Substance and Sexual Activity   • Alcohol use: No   • Drug use: No   • Sexual activity: Defer       The following portions of the patient's history were reviewed and updated as appropriate: allergies, current medications, past family history, past medical history, past social history, past surgical history and problem list.    Review of Systems          Physical Exam  Vitals signs and nursing note reviewed.   Neurological:      Mental Status: She is alert and oriented to person, place, and time.         Assessment    Max was seen today for follow-up.    Diagnoses and all orders for this visit:    Osteopenia, unspecified location        Plan  Counseling visit regarding osteopenia.  We discussed the pathophysiology of this condition and I answered the patient's questions.  We discussed options for management.  At this time, prescription medications are not indicated.  The  patient will continue regular, weightbearing exercise.  She will continue taking vitamin D.  I recommend a repeat DEXA in 2 years.  1. No follow-ups on file.    Social History     Tobacco Use   Smoking Status Never Smoker   2.

## 2020-11-23 ENCOUNTER — OFFICE VISIT (OUTPATIENT)
Dept: FAMILY MEDICINE CLINIC | Facility: CLINIC | Age: 75
End: 2020-11-23

## 2020-11-23 VITALS
HEART RATE: 65 BPM | HEIGHT: 62 IN | SYSTOLIC BLOOD PRESSURE: 120 MMHG | DIASTOLIC BLOOD PRESSURE: 80 MMHG | BODY MASS INDEX: 26.65 KG/M2 | TEMPERATURE: 98 F | OXYGEN SATURATION: 99 % | WEIGHT: 144.8 LBS

## 2020-11-23 DIAGNOSIS — N18.31 STAGE 3A CHRONIC KIDNEY DISEASE (HCC): ICD-10-CM

## 2020-11-23 DIAGNOSIS — I10 ESSENTIAL HYPERTENSION: ICD-10-CM

## 2020-11-23 DIAGNOSIS — D64.9 ANEMIA, UNSPECIFIED TYPE: Primary | ICD-10-CM

## 2020-11-23 DIAGNOSIS — Z00.00 MEDICARE ANNUAL WELLNESS VISIT, SUBSEQUENT: ICD-10-CM

## 2020-11-23 PROCEDURE — G0439 PPPS, SUBSEQ VISIT: HCPCS | Performed by: INTERNAL MEDICINE

## 2020-11-23 RX ORDER — DORZOLAMIDE HYDROCHLORIDE AND TIMOLOL MALEATE 20; 5 MG/ML; MG/ML
SOLUTION/ DROPS OPHTHALMIC
COMMUNITY
Start: 2020-11-17

## 2020-11-24 LAB
ALBUMIN SERPL-MCNC: 4.5 G/DL (ref 3.5–5.2)
ALBUMIN/GLOB SERPL: 1.5 G/DL
ALP SERPL-CCNC: 95 U/L (ref 39–117)
ALT SERPL-CCNC: 21 U/L (ref 1–33)
AST SERPL-CCNC: 20 U/L (ref 1–32)
BASOPHILS # BLD AUTO: 0.04 10*3/MM3 (ref 0–0.2)
BASOPHILS NFR BLD AUTO: 0.8 % (ref 0–1.5)
BILIRUB SERPL-MCNC: 0.3 MG/DL (ref 0–1.2)
BUN SERPL-MCNC: 19 MG/DL (ref 8–23)
BUN/CREAT SERPL: 19.6 (ref 7–25)
CALCIUM SERPL-MCNC: 9.7 MG/DL (ref 8.6–10.5)
CHLORIDE SERPL-SCNC: 103 MMOL/L (ref 98–107)
CO2 SERPL-SCNC: 27.5 MMOL/L (ref 22–29)
CREAT SERPL-MCNC: 0.97 MG/DL (ref 0.57–1)
EOSINOPHIL # BLD AUTO: 0.07 10*3/MM3 (ref 0–0.4)
EOSINOPHIL NFR BLD AUTO: 1.3 % (ref 0.3–6.2)
ERYTHROCYTE [DISTWIDTH] IN BLOOD BY AUTOMATED COUNT: 11.6 % (ref 12.3–15.4)
GLOBULIN SER CALC-MCNC: 3 GM/DL
GLUCOSE SERPL-MCNC: 88 MG/DL (ref 65–99)
HCT VFR BLD AUTO: 31.4 % (ref 34–46.6)
HCV AB S/CO SERPL IA: <0.1 S/CO RATIO (ref 0–0.9)
HGB BLD-MCNC: 10.4 G/DL (ref 12–15.9)
IMM GRANULOCYTES # BLD AUTO: 0.02 10*3/MM3 (ref 0–0.05)
IMM GRANULOCYTES NFR BLD AUTO: 0.4 % (ref 0–0.5)
LYMPHOCYTES # BLD AUTO: 1.64 10*3/MM3 (ref 0.7–3.1)
LYMPHOCYTES NFR BLD AUTO: 31.2 % (ref 19.6–45.3)
MCH RBC QN AUTO: 31 PG (ref 26.6–33)
MCHC RBC AUTO-ENTMCNC: 33.1 G/DL (ref 31.5–35.7)
MCV RBC AUTO: 93.5 FL (ref 79–97)
MONOCYTES # BLD AUTO: 0.58 10*3/MM3 (ref 0.1–0.9)
MONOCYTES NFR BLD AUTO: 11 % (ref 5–12)
NEUTROPHILS # BLD AUTO: 2.9 10*3/MM3 (ref 1.7–7)
NEUTROPHILS NFR BLD AUTO: 55.3 % (ref 42.7–76)
NRBC BLD AUTO-RTO: 0 /100 WBC (ref 0–0.2)
PLATELET # BLD AUTO: 228 10*3/MM3 (ref 140–450)
POTASSIUM SERPL-SCNC: 3.9 MMOL/L (ref 3.5–5.2)
PROT SERPL-MCNC: 7.5 G/DL (ref 6–8.5)
RBC # BLD AUTO: 3.36 10*6/MM3 (ref 3.77–5.28)
SODIUM SERPL-SCNC: 137 MMOL/L (ref 136–145)
WBC # BLD AUTO: 5.25 10*3/MM3 (ref 3.4–10.8)

## 2020-12-22 PROCEDURE — 90715 TDAP VACCINE 7 YRS/> IM: CPT | Performed by: INTERNAL MEDICINE

## 2020-12-22 PROCEDURE — 90471 IMMUNIZATION ADMIN: CPT | Performed by: INTERNAL MEDICINE

## 2021-01-11 RX ORDER — LISINOPRIL 40 MG/1
40 TABLET ORAL
Status: CANCELLED | OUTPATIENT
Start: 2021-01-11

## 2021-01-11 NOTE — TELEPHONE ENCOUNTER
Caller: Max Kirby    Relationship: Self    Best call back number: [phone]    Medication needed:   Requested Prescriptions     Pending Prescriptions Disp Refills   • lisinopril (PRINIVIL,ZESTRIL) 40 MG tablet        Sig: Take 1 tablet by mouth.       When do you need the refill by: ASAP    Does the patient have less than a 3 day supply:  [x] Yes  [] No    What is the patient's preferred pharmacy: [address] ([facility]), [place] - [address] [facility] [phone] [facility] [phone]           Mild, normocytic, has had occasional rectal bleeding, with recent Dx of MS concerning for anemia of chronic disease. Repeat in 6 weeks

## 2021-01-13 ENCOUNTER — TELEPHONE (OUTPATIENT)
Dept: FAMILY MEDICINE CLINIC | Facility: CLINIC | Age: 76
End: 2021-01-13

## 2021-01-13 NOTE — TELEPHONE ENCOUNTER
PT'S MED IN PENDING STATUS SINCE THE 11TH AND SHE IS OUT OF MEDS TOMORROW.     lisinopril (PRINIVIL,ZESTRIL) 40 MG tablet [[id number]] (Order [id number])    COULD YOU PLEASE RELEASE THIS ORDER ID POSSIBLE.    THANK YOU

## 2021-01-14 ENCOUNTER — TELEPHONE (OUTPATIENT)
Dept: FAMILY MEDICINE CLINIC | Facility: CLINIC | Age: 76
End: 2021-01-14

## 2021-01-14 RX ORDER — LISINOPRIL 40 MG/1
40 TABLET ORAL
Status: CANCELLED | OUTPATIENT
Start: 2021-01-14

## 2021-01-14 NOTE — TELEPHONE ENCOUNTER
The patient asked her daughter's employer, DC Lott with "[employer]" (who works under Dr. Fox) to call in the prescription for Lisinopril 40mg #90 to her pharmacy.  This was called in verbally to the pharmacy today at 1:37 pm by Lyudmila.  Phone number [phone].  After speaking with Dr. Townsend, I informed the patient that it is illegal for a provider to write a prescription for someone they are not currently treating.  Dr. Townsend has the original message for the refill, but has not approved it yet.  He will address it today after seeing patient's in office.

## 2021-01-14 NOTE — TELEPHONE ENCOUNTER
Caller: Max Kirby    Relationship: Self    Best call back number: [phone]    Medication needed:   Requested Prescriptions     Pending Prescriptions Disp Refills   • lisinopril (PRINIVIL,ZESTRIL) 40 MG tablet        Sig: Take 1 tablet by mouth.       When do you need the refill by: ASAP    What details did the patient provide when requesting the medication: PT IS OUT OF MEDICATION      Does the patient have less than a 3 day supply:  [x] Yes  [] No    What is the patient's preferred pharmacy: [address] ([facility]), [place] - [address] [facility] [phone] [facility] [phone]

## 2021-01-15 RX ORDER — LISINOPRIL 40 MG/1
40 TABLET ORAL DAILY
Qty: 90 TABLET | Refills: 2 | Status: SHIPPED | OUTPATIENT
Start: 2021-01-15 | End: 2021-09-21 | Stop reason: SDUPTHER

## 2021-03-03 DIAGNOSIS — Z23 IMMUNIZATION DUE: ICD-10-CM

## 2021-03-09 ENCOUNTER — PROCEDURE VISIT (OUTPATIENT)
Dept: OBSTETRICS AND GYNECOLOGY | Facility: CLINIC | Age: 76
End: 2021-03-09

## 2021-03-09 ENCOUNTER — OFFICE VISIT (OUTPATIENT)
Dept: OBSTETRICS AND GYNECOLOGY | Facility: CLINIC | Age: 76
End: 2021-03-09

## 2021-03-09 ENCOUNTER — APPOINTMENT (OUTPATIENT)
Dept: WOMENS IMAGING | Facility: HOSPITAL | Age: 76
End: 2021-03-09

## 2021-03-09 VITALS
BODY MASS INDEX: 25.95 KG/M2 | HEIGHT: 62 IN | WEIGHT: 141 LBS | DIASTOLIC BLOOD PRESSURE: 84 MMHG | SYSTOLIC BLOOD PRESSURE: 142 MMHG

## 2021-03-09 DIAGNOSIS — R32 URINARY INCONTINENCE, UNSPECIFIED TYPE: ICD-10-CM

## 2021-03-09 DIAGNOSIS — Z12.31 VISIT FOR SCREENING MAMMOGRAM: Primary | ICD-10-CM

## 2021-03-09 DIAGNOSIS — Z00.00 ANNUAL PHYSICAL EXAM: Primary | ICD-10-CM

## 2021-03-09 PROCEDURE — 77067 SCR MAMMO BI INCL CAD: CPT | Performed by: OBSTETRICS & GYNECOLOGY

## 2021-03-09 PROCEDURE — G0101 CA SCREEN;PELVIC/BREAST EXAM: HCPCS | Performed by: OBSTETRICS & GYNECOLOGY

## 2021-03-09 PROCEDURE — 77067 SCR MAMMO BI INCL CAD: CPT | Performed by: RADIOLOGY

## 2021-03-09 PROCEDURE — 77063 BREAST TOMOSYNTHESIS BI: CPT | Performed by: RADIOLOGY

## 2021-03-09 PROCEDURE — 77063 BREAST TOMOSYNTHESIS BI: CPT | Performed by: OBSTETRICS & GYNECOLOGY

## 2021-03-09 RX ORDER — NETARSUDIL 0.2 MG/ML
SOLUTION/ DROPS OPHTHALMIC; TOPICAL
COMMUNITY
Start: 2021-03-02

## 2021-03-09 NOTE — PROGRESS NOTES
HPI   Max Bonds  is a 75 y.o. female who presents for routine gynecologic exam.  Overall, she is feeling well.  She does have episodes of nocturnal urinary incontinence, but these are only intermittent.  Bowels are functioning normally.  Mammogram was performed today.  The patient is current with colon cancer screening.    Chief Complaint   Patient presents with   • Gynecologic Exam     Patient here for AE.  MX-today; colonsoscopy-8 yrs ago       Past Medical History:   Diagnosis Date   • Allergic    • Arthritis    • Cataract    • Hypertension    • Osteoporosis        Past Surgical History:   Procedure Laterality Date   • BREAST CYST EXCISION     • HYSTERECTOMY         Social History     Socioeconomic History   • Marital status:      Spouse name: Not on file   • Number of children: Not on file   • Years of education: Not on file   • Highest education level: Not on file   Tobacco Use   • Smoking status: Never Smoker   • Smokeless tobacco: Never Used   Substance and Sexual Activity   • Alcohol use: No   • Drug use: No   • Sexual activity: Defer       The following portions of the patient's history were reviewed and updated as appropriate: allergies, current medications, past family history, past medical history, past social history, past surgical history and problem list.    Review of Systems   Constitutional: Negative.    HENT: Negative.    Respiratory: Negative.    Cardiovascular: Negative.    Gastrointestinal: Negative.    Genitourinary: Negative.    Musculoskeletal: Negative.    Skin: Negative.    Allergic/Immunologic: Negative.    Psychiatric/Behavioral: Negative.              Physical Exam  Vitals and nursing note reviewed.   Constitutional:       Appearance: She is well-developed.   HENT:      Head: Normocephalic and atraumatic.   Cardiovascular:      Rate and Rhythm: Normal rate and regular rhythm.   Pulmonary:      Effort: Pulmonary effort is normal.      Breath sounds: Normal breath sounds.  No wheezing or rales.   Chest:      Comments: The breasts are homogeneous.  There are no palpable lumps.  Nipple discharge and axillary adenopathy are absent.  Abdominal:      General: There is no distension.      Palpations: Abdomen is soft.      Tenderness: There is no abdominal tenderness.   Genitourinary:     Labia:         Right: No lesion.         Left: No lesion.       Comments: The urethra and urethral meatus are normal.  Bartholin's and Mayhill's glands are normal.  The vagina is atrophic.  Support is adequate.  The cervix is surgically absent.  The uterus is surgically absent.  No pelvic masses are palpable  Skin:     General: Skin is warm and dry.   Neurological:      Mental Status: She is alert and oriented to person, place, and time.         Assessment    Diagnoses and all orders for this visit:    1. Annual physical exam (Primary)    2. Urinary incontinence, unspecified type        Plan  1. Annual examination performed  2. Counseled regarding the importance of regular screening mammograms.  Mammogram was performed today.  3. The patient is current with colon cancer screening.  4. Intermittent urinary incontinence, which occurs mostly at night.  Counseled.  We discussed the possibility of treatment.  The patient declines this for now.    Return in about 1 year (around 3/9/2022).    Social History     Tobacco Use   Smoking Status Never Smoker

## 2021-03-13 ENCOUNTER — IMMUNIZATION (OUTPATIENT)
Dept: VACCINE CLINIC | Facility: HOSPITAL | Age: 76
End: 2021-03-13

## 2021-03-13 PROCEDURE — 0001A: CPT | Performed by: INTERNAL MEDICINE

## 2021-03-13 PROCEDURE — 91300 HC SARSCOV02 VAC 30MCG/0.3ML IM: CPT | Performed by: INTERNAL MEDICINE

## 2021-04-03 ENCOUNTER — IMMUNIZATION (OUTPATIENT)
Dept: VACCINE CLINIC | Facility: HOSPITAL | Age: 76
End: 2021-04-03

## 2021-04-03 PROCEDURE — 0002A: CPT | Performed by: INTERNAL MEDICINE

## 2021-04-03 PROCEDURE — 91300 HC SARSCOV02 VAC 30MCG/0.3ML IM: CPT | Performed by: INTERNAL MEDICINE

## 2021-04-05 DIAGNOSIS — I10 ESSENTIAL HYPERTENSION: Primary | ICD-10-CM

## 2021-04-05 RX ORDER — AMLODIPINE BESYLATE 10 MG/1
10 TABLET ORAL DAILY
Qty: 30 TABLET | Refills: 1 | Status: SHIPPED | OUTPATIENT
Start: 2021-04-05 | End: 2021-05-24 | Stop reason: SDUPTHER

## 2021-04-05 NOTE — TELEPHONE ENCOUNTER
Caller: Max Ortiz    Relationship: Self    Best call back number: [phone]    Medication needed:   Requested Prescriptions     Pending Prescriptions Disp Refills   • amLODIPine (NORVASC) 10 MG tablet       Sig: Take 1 tablet by mouth.       When do you need the refill by: ASAP      Does the patient have less than a 3 day supply:  [] Yes  [x] No    What is the patient's preferred pharmacy: [address] ([facility]), [place] - [address] [facility] [phone] [facility] [phone]

## 2021-05-24 ENCOUNTER — OFFICE VISIT (OUTPATIENT)
Dept: FAMILY MEDICINE CLINIC | Facility: CLINIC | Age: 76
End: 2021-05-24

## 2021-05-24 VITALS
HEIGHT: 62 IN | OXYGEN SATURATION: 100 % | DIASTOLIC BLOOD PRESSURE: 88 MMHG | BODY MASS INDEX: 25.65 KG/M2 | HEART RATE: 70 BPM | RESPIRATION RATE: 16 BRPM | SYSTOLIC BLOOD PRESSURE: 130 MMHG | WEIGHT: 139.4 LBS

## 2021-05-24 DIAGNOSIS — F41.9 ANXIETY: Primary | ICD-10-CM

## 2021-05-24 DIAGNOSIS — I10 ESSENTIAL HYPERTENSION: ICD-10-CM

## 2021-05-24 PROCEDURE — 99214 OFFICE O/P EST MOD 30 MIN: CPT | Performed by: INTERNAL MEDICINE

## 2021-05-24 RX ORDER — LORAZEPAM 0.5 MG/1
0.5 TABLET ORAL EVERY 8 HOURS PRN
Qty: 14 TABLET | Refills: 0 | Status: SHIPPED | OUTPATIENT
Start: 2021-05-24 | End: 2021-10-12

## 2021-05-24 RX ORDER — FAMOTIDINE 20 MG/1
20 TABLET, FILM COATED ORAL 2 TIMES DAILY
Qty: 60 TABLET | Refills: 1 | Status: SHIPPED | OUTPATIENT
Start: 2021-05-24 | End: 2022-01-12

## 2021-05-24 RX ORDER — AMLODIPINE BESYLATE 10 MG/1
10 TABLET ORAL DAILY
Qty: 90 TABLET | Refills: 1 | Status: SHIPPED | OUTPATIENT
Start: 2021-05-24 | End: 2021-12-06

## 2021-05-24 NOTE — PROGRESS NOTES
Answers for HPI/ROS submitted by the patient on 5/22/2021  What is the primary reason for your visit?: High Blood Pressure    05/24/2021    CC: Hypertension (follow up...no other issues)  .        HPI  Hypertension  This is a chronic problem. The current episode started more than 1 year ago. The problem has been waxing and waning since onset. The problem is uncontrolled. Associated symptoms include anxiety. There are no associated agents to hypertension. There are no known risk factors for coronary artery disease. Past treatments include alpha 1 blockers and beta blockers.        Subjective   Max Ortiz is a 75 y.o. female.      The following portions of the patient's history were reviewed and updated as appropriate: allergies, current medications, past family history, past medical history, past social history, past surgical history and problem list.    Problem List  There is no problem list on file for this patient.      Past Medical History  Past Medical History:   Diagnosis Date   • Allergic    • Arthritis    • Cataract    • Hypertension    • Osteoporosis        Surgical History  Past Surgical History:   Procedure Laterality Date   • BREAST CYST EXCISION     • HYSTERECTOMY         Family History  Family History   Problem Relation Age of Onset   • Hypertension Father    • Diabetes Father    • Colon cancer Father    • Alzheimer's disease Mother    • Hypertension Mother    • Heart disease Mother    • Colon cancer Paternal Aunt    • Breast cancer Cousin        Social History  Social History    Tobacco Use      Smoking status: Never Smoker      Smokeless tobacco: Never Used       Is the Patient a current tobacco user? No    Allergies  Allergies   Allergen Reactions   • Aspirin Other (See Comments)     UPSETS STOMACH   • Penicillins Other (See Comments)     RED AND SWOLLEN AT INJECTION SITE       Current Medications    Current Outpatient Medications:   •  amLODIPine (NORVASC) 10 MG tablet, Take 1 tablet by  mouth Daily., Disp: 90 tablet, Rfl: 1  •  difluprednate (Durezol) 0.05 % ophthalmic emulsion, 1 drop 4 (Four) Times a Day., Disp: , Rfl:   •  dorzolamide-timolol (COSOPT) 22.3-6.8 MG/ML ophthalmic solution, INSTILL 1 DROP INTO EACH EYE TWICE DAILY, Disp: , Rfl:   •  hydrALAZINE (APRESOLINE) 25 MG tablet, Take 1 tablet by mouth 2 (Two) Times a Day., Disp: 60 tablet, Rfl: 0  •  lisinopril (PRINIVIL,ZESTRIL) 40 MG tablet, Take 1 tablet by mouth Daily., Disp: 90 tablet, Rfl: 2  •  Rhopressa 0.02 % solution, INSTILL 1 DROP INTO EACH EYE EVERY DAY AT BEDTIME, Disp: , Rfl:   •  famotidine (Pepcid) 20 MG tablet, Take 1 tablet by mouth 2 (Two) Times a Day., Disp: 60 tablet, Rfl: 1  •  LORazepam (Ativan) 0.5 MG tablet, Take 1 tablet by mouth Every 8 (Eight) Hours As Needed for Anxiety., Disp: 14 tablet, Rfl: 0     Review of System  Review of Systems   HENT: Negative.    Eyes: Negative.    Respiratory: Negative.    Cardiovascular: Negative.    Gastrointestinal: Negative.    Endocrine: Negative.      I have reviewed and confirmed the accuracy of the ROS as documented by the MA/LPN/RN Chintan Townsend MD    Vitals:    05/24/21 1338   BP: 130/88   Pulse: 70   Resp: 16   SpO2: 100%     Body mass index is 25.5 kg/m².    Objective     Physical Exam  Physical Exam  Cardiovascular:      Rate and Rhythm: Normal rate and regular rhythm.      Heart sounds: Normal heart sounds.   Pulmonary:      Effort: Pulmonary effort is normal.      Breath sounds: Normal breath sounds.         Assessment/Plan      This pleasant 75-year-old presents at this time for follow-up of hypertension.  She relates she is taking the medication as prescribed.  She relates she is having dull headaches off and on for the past week or so.      Patient has a history of hypertension and is currently on amlodipine 10 mg p.o. daily, lisinopril 40 mg p.o. daily and hydralazine 25 mg twice daily.  Her blood pressure today was 144/88.  She has had her medicines on time  today.  She has been under more stress over the past few weeks.  She relates she sleeps well getting 7 to 8 hours usually.  Her blood pressure 2 months ago was essentially the same 142/84 in the left arm sitting position standard cuff.  Her weight is down actually 2 pounds from last visit.    Her sodium intake is low she states.                  Diagnoses and all orders for this visit:    1. Anxiety (Primary)  -     LORazepam (Ativan) 0.5 MG tablet; Take 1 tablet by mouth Every 8 (Eight) Hours As Needed for Anxiety.  Dispense: 14 tablet; Refill: 0    2. Essential hypertension  -     amLODIPine (NORVASC) 10 MG tablet; Take 1 tablet by mouth Daily.  Dispense: 90 tablet; Refill: 1    Other orders  -     famotidine (Pepcid) 20 MG tablet; Take 1 tablet by mouth 2 (Two) Times a Day.  Dispense: 60 tablet; Refill: 1          Plan:  1.)  2.)  Follow-up in 2 to 3 weeks  3.)  Consider low-dose HCTZ for blood pressure control.  Plan start lorazepam 0.5 mg 1 tab p.o. nightly for anxiety    Chintan Townsend MD  05/24/2021

## 2021-06-07 ENCOUNTER — OFFICE VISIT (OUTPATIENT)
Dept: FAMILY MEDICINE CLINIC | Facility: CLINIC | Age: 76
End: 2021-06-07

## 2021-06-07 VITALS
HEIGHT: 62 IN | RESPIRATION RATE: 16 BRPM | WEIGHT: 140 LBS | BODY MASS INDEX: 25.76 KG/M2 | DIASTOLIC BLOOD PRESSURE: 96 MMHG | SYSTOLIC BLOOD PRESSURE: 140 MMHG

## 2021-06-07 DIAGNOSIS — I10 ESSENTIAL HYPERTENSION: Primary | ICD-10-CM

## 2021-06-07 DIAGNOSIS — F41.9 ANXIETY: ICD-10-CM

## 2021-06-07 PROCEDURE — 99214 OFFICE O/P EST MOD 30 MIN: CPT | Performed by: INTERNAL MEDICINE

## 2021-06-07 RX ORDER — LORAZEPAM 0.5 MG/1
0.5 TABLET ORAL EVERY 8 HOURS PRN
Qty: 14 TABLET | Refills: 1 | Status: SHIPPED | OUTPATIENT
Start: 2021-06-07 | End: 2022-05-17

## 2021-06-07 RX ORDER — HYDROCHLOROTHIAZIDE 12.5 MG/1
12.5 TABLET ORAL DAILY
Qty: 30 TABLET | Refills: 1 | Status: SHIPPED | OUTPATIENT
Start: 2021-06-07 | End: 2021-10-12

## 2021-06-07 NOTE — PROGRESS NOTES
06/07/2021    CC: Anxiety (follow up...no other issues)  .        HPI  Anxiety  Presents for follow-up visit. Symptoms include excessive worry and insomnia. Symptoms occur most days.            Subjective   Max Ortiz is a 75 y.o. female.      The following portions of the patient's history were reviewed and updated as appropriate: allergies, current medications, past family history, past medical history, past social history, past surgical history and problem list.    Problem List  There is no problem list on file for this patient.      Past Medical History  Past Medical History:   Diagnosis Date   • Allergic    • Arthritis    • Cataract    • Hypertension    • Osteoporosis        Surgical History  Past Surgical History:   Procedure Laterality Date   • BREAST CYST EXCISION     • HYSTERECTOMY         Family History  Family History   Problem Relation Age of Onset   • Hypertension Father    • Diabetes Father    • Colon cancer Father    • Alzheimer's disease Mother    • Hypertension Mother    • Heart disease Mother    • Colon cancer Paternal Aunt    • Breast cancer Cousin        Social History  Social History    Tobacco Use      Smoking status: Never Smoker      Smokeless tobacco: Never Used       Is the Patient a current tobacco user? No    Allergies  Allergies   Allergen Reactions   • Aspirin Other (See Comments)     UPSETS STOMACH   • Penicillins Other (See Comments)     RED AND SWOLLEN AT INJECTION SITE       Current Medications    Current Outpatient Medications:   •  amLODIPine (NORVASC) 10 MG tablet, Take 1 tablet by mouth Daily., Disp: 90 tablet, Rfl: 1  •  difluprednate (Durezol) 0.05 % ophthalmic emulsion, 1 drop 4 (Four) Times a Day., Disp: , Rfl:   •  dorzolamide-timolol (COSOPT) 22.3-6.8 MG/ML ophthalmic solution, INSTILL 1 DROP INTO EACH EYE TWICE DAILY, Disp: , Rfl:   •  famotidine (Pepcid) 20 MG tablet, Take 1 tablet by mouth 2 (Two) Times a Day., Disp: 60 tablet, Rfl: 1  •  hydrALAZINE  (APRESOLINE) 25 MG tablet, Take 1 tablet by mouth 2 (Two) Times a Day., Disp: 60 tablet, Rfl: 0  •  lisinopril (PRINIVIL,ZESTRIL) 40 MG tablet, Take 1 tablet by mouth Daily., Disp: 90 tablet, Rfl: 2  •  LORazepam (Ativan) 0.5 MG tablet, Take 1 tablet by mouth Every 8 (Eight) Hours As Needed for Anxiety., Disp: 14 tablet, Rfl: 0  •  Rhopressa 0.02 % solution, INSTILL 1 DROP INTO EACH EYE EVERY DAY AT BEDTIME, Disp: , Rfl:      Review of System  Review of Systems   Constitutional: Negative.    Endocrine: Negative.    Psychiatric/Behavioral: The patient has insomnia.      I have reviewed and confirmed the accuracy of the ROS as documented by the MA/LPN/RN Chintan Townsend MD    Vitals:    06/07/21 0904   BP: 140/96   Resp: 16     Body mass index is 25.61 kg/m².    Objective     Physical Exam  Physical Exam  Cardiovascular:      Rate and Rhythm: Regular rhythm.      Pulses: Normal pulses.      Heart sounds: Normal heart sounds.   Pulmonary:      Effort: Pulmonary effort is normal.   Musculoskeletal:      Cervical back: Normal range of motion.         Assessment/Plan        This pleasant 75-year-old presents at this time for follow-up of hypertension.  She was last seen on 5/24.  Her blood pressure at that time was 130/88 left arm sitting position.  Today it is 138/92 in the left arm sitting position.  The patient with her last visit was given lorazepam because of increasing anxiety and stress.  She relates that that medication was instrumental in helping her to feel better and sleep better over the past 2 weeks.  Note is made that her  has Alzheimer's disease and his course has been fluctuating.  She relates that with the lorazepam at bedtime she was able to sleep through the night and felt much more refreshed during the day.    Patient has a history of hypertension and currently is on hydralazine twice a day, amlodipine 10 mg p.o. daily and lisinopril 40 mg p.o. daily.  I would like her blood pressure in a more  improved state.    She is urged to continue a low-sodium diet.      Diagnoses and all orders for this visit:    1. Essential hypertension (Primary)    2. Anxiety     continue amlodipine, lisinopril and hydralazine.    Continue lorazepam 0.5 mg 1 tab p.o. nightly dispense #14 refill x1.  #3 pure) add HCTZ 12.5 mg 1 tab p.o. every morning for hypertension       Chintan Townsend MD  06/07/2021

## 2021-07-12 ENCOUNTER — OFFICE VISIT (OUTPATIENT)
Dept: FAMILY MEDICINE CLINIC | Facility: CLINIC | Age: 76
End: 2021-07-12

## 2021-07-12 VITALS
WEIGHT: 140 LBS | DIASTOLIC BLOOD PRESSURE: 80 MMHG | RESPIRATION RATE: 16 BRPM | HEIGHT: 62 IN | SYSTOLIC BLOOD PRESSURE: 140 MMHG | BODY MASS INDEX: 25.76 KG/M2

## 2021-07-12 DIAGNOSIS — I10 ESSENTIAL HYPERTENSION: Primary | ICD-10-CM

## 2021-07-12 DIAGNOSIS — F41.9 ANXIETY: ICD-10-CM

## 2021-07-12 PROCEDURE — 99213 OFFICE O/P EST LOW 20 MIN: CPT | Performed by: INTERNAL MEDICINE

## 2021-07-16 NOTE — PROGRESS NOTES
07/12/2021    CC: Hypertension (f/u.  discuss new dx of RA and inflammation of the eyes)  .        HPI  Hypertension  This is a chronic problem. The current episode started more than 1 year ago. The problem has been resolved since onset. The problem is controlled. Associated symptoms include anxiety. Risk factors for coronary artery disease include dyslipidemia.        Subjective   Max Ortiz is a 75 y.o. female.      The following portions of the patient's history were reviewed and updated as appropriate: allergies, current medications, past family history, past medical history, past social history, past surgical history and problem list.    Problem List  There is no problem list on file for this patient.      Past Medical History  Past Medical History:   Diagnosis Date   • Allergic    • Arthritis    • Cataract    • Hypertension    • Osteoporosis        Surgical History  Past Surgical History:   Procedure Laterality Date   • BREAST CYST EXCISION     • HYSTERECTOMY         Family History  Family History   Problem Relation Age of Onset   • Hypertension Father    • Diabetes Father    • Colon cancer Father    • Alzheimer's disease Mother    • Hypertension Mother    • Heart disease Mother    • Colon cancer Paternal Aunt    • Breast cancer Cousin        Social History  Social History    Tobacco Use      Smoking status: Never Smoker      Smokeless tobacco: Never Used       Is the Patient a current tobacco user? No    Allergies  Allergies   Allergen Reactions   • Aspirin Other (See Comments)     UPSETS STOMACH   • Penicillins Other (See Comments)     RED AND SWOLLEN AT INJECTION SITE       Current Medications    Current Outpatient Medications:   •  amLODIPine (NORVASC) 10 MG tablet, Take 1 tablet by mouth Daily., Disp: 90 tablet, Rfl: 1  •  difluprednate (Durezol) 0.05 % ophthalmic emulsion, 1 drop 4 (Four) Times a Day., Disp: , Rfl:   •  dorzolamide-timolol (COSOPT) 22.3-6.8 MG/ML ophthalmic solution, INSTILL 1  DROP INTO EACH EYE TWICE DAILY, Disp: , Rfl:   •  famotidine (Pepcid) 20 MG tablet, Take 1 tablet by mouth 2 (Two) Times a Day., Disp: 60 tablet, Rfl: 1  •  hydrALAZINE (APRESOLINE) 25 MG tablet, Take 1 tablet by mouth 2 (Two) Times a Day., Disp: 60 tablet, Rfl: 0  •  hydroCHLOROthiazide (HYDRODIURIL) 12.5 MG tablet, Take 1 tablet by mouth Daily., Disp: 30 tablet, Rfl: 1  •  lisinopril (PRINIVIL,ZESTRIL) 40 MG tablet, Take 1 tablet by mouth Daily., Disp: 90 tablet, Rfl: 2  •  LORazepam (Ativan) 0.5 MG tablet, Take 1 tablet by mouth Every 8 (Eight) Hours As Needed for Anxiety., Disp: 14 tablet, Rfl: 0  •  LORazepam (Ativan) 0.5 MG tablet, Take 1 tablet by mouth Every 8 (Eight) Hours As Needed for Anxiety., Disp: 14 tablet, Rfl: 1  •  Rhopressa 0.02 % solution, INSTILL 1 DROP INTO EACH EYE EVERY DAY AT BEDTIME, Disp: , Rfl:      Review of System  Review of Systems   Respiratory: Negative.    Cardiovascular: Negative.    Gastrointestinal: Negative.      I have reviewed and confirmed the accuracy of the ROS as documented by the MA/LPN/RN Chintan Townsend MD    Vitals:    07/12/21 0957   BP: 140/80   Resp: 16     Body mass index is 25.61 kg/m².    Objective     Physical Exam  Physical Exam  HENT:      Head: Normocephalic.   Cardiovascular:      Rate and Rhythm: Normal rate and regular rhythm.      Pulses: Normal pulses.      Heart sounds: Normal heart sounds.   Pulmonary:      Effort: Pulmonary effort is normal.      Breath sounds: Normal breath sounds.   Abdominal:      General: Abdomen is flat.      Palpations: Abdomen is soft.   Musculoskeletal:      Cervical back: Normal range of motion.         Assessment/Plan      This pleasant 75-year-old presents at this time for follow-up of hypertension.  With her last visit HCTZ was added and her blood pressures much improved from a prior 140/96 back on 6/7.  Current 130/80.  She also relates that she's resting much more comfortably after having started the lorazepam 0.5 mg.   She's been on much stress with illness in her  and other family problems but she's feeling much better now.  She relates that recently had a visit with Em Lugo ophthalmologist she was told that there was some abnormalities with her eye.  We do not have those notes and we'll try to secure those.    Patient relates that she ran out of HCTZ about a week ago.  Her blood pressure today is 130/80 in the left arm sitting position standard cuff            Diagnoses and all orders for this visit:    1. Essential hypertension (Primary)    2. Anxiety      Plan:  1.)  Remain off HCTZ as her blood pressures well controlled at this point.  #2.)  Continue lorazepam  #3.)  Follow-up in 3-4 months.       Chintan Townsend MD  07/12/2021

## 2021-09-21 DIAGNOSIS — I10 ESSENTIAL HYPERTENSION: Primary | ICD-10-CM

## 2021-09-21 DIAGNOSIS — I10 ESSENTIAL HYPERTENSION, BENIGN: ICD-10-CM

## 2021-09-21 RX ORDER — LISINOPRIL 40 MG/1
40 TABLET ORAL DAILY
Qty: 90 TABLET | Refills: 2 | Status: SHIPPED | OUTPATIENT
Start: 2021-09-21 | End: 2022-06-20

## 2021-09-21 RX ORDER — HYDRALAZINE HYDROCHLORIDE 25 MG/1
25 TABLET, FILM COATED ORAL 2 TIMES DAILY
Qty: 60 TABLET | Refills: 5 | Status: SHIPPED | OUTPATIENT
Start: 2021-09-21 | End: 2022-03-14

## 2021-09-21 NOTE — TELEPHONE ENCOUNTER
PT CALLED AND STATED SHE NEEDS TO HAVE THE BELOW MED FILLED     hydrALAZINE (APRESOLINE) 25 MG tablet [8509]    Pharmacy:   [facility] - [place], [place] - [facility] [address], [address] - [phone] [facility] [phone] FX      THANK YOU

## 2021-10-12 ENCOUNTER — OFFICE VISIT (OUTPATIENT)
Dept: FAMILY MEDICINE CLINIC | Facility: CLINIC | Age: 76
End: 2021-10-12

## 2021-10-12 VITALS
DIASTOLIC BLOOD PRESSURE: 88 MMHG | RESPIRATION RATE: 16 BRPM | SYSTOLIC BLOOD PRESSURE: 130 MMHG | BODY MASS INDEX: 25.95 KG/M2 | WEIGHT: 141 LBS | HEIGHT: 62 IN

## 2021-10-12 DIAGNOSIS — M25.562 ARTHRALGIA OF BOTH KNEES: Primary | Chronic | ICD-10-CM

## 2021-10-12 DIAGNOSIS — M79.641 BILATERAL HAND PAIN: ICD-10-CM

## 2021-10-12 DIAGNOSIS — M25.561 ARTHRALGIA OF BOTH KNEES: Primary | Chronic | ICD-10-CM

## 2021-10-12 DIAGNOSIS — I10 PRIMARY HYPERTENSION: Chronic | ICD-10-CM

## 2021-10-12 DIAGNOSIS — M79.642 BILATERAL HAND PAIN: ICD-10-CM

## 2021-10-12 PROCEDURE — 99213 OFFICE O/P EST LOW 20 MIN: CPT | Performed by: INTERNAL MEDICINE

## 2021-10-14 LAB
ANA SER QL: NEGATIVE
CCP IGA+IGG SERPL IA-ACNC: 6 UNITS (ref 0–19)
CRP SERPL-MCNC: <0.3 MG/DL (ref 0–0.5)
ERYTHROCYTE [SEDIMENTATION RATE] IN BLOOD BY WESTERGREN METHOD: 22 MM/HR (ref 0–30)
URATE SERPL-MCNC: 3.9 MG/DL (ref 2.4–5.7)

## 2021-10-16 NOTE — PROGRESS NOTES
10/12/2021    CC: Hypertension (f/u.  discuss possible rheumatoid arthritis)  .        HPI  Hypertension  This is a chronic problem. The current episode started more than 1 year ago. The problem has been gradually improving since onset. The problem is controlled. Associated symptoms include anxiety. There are no associated agents to hypertension. Risk factors for coronary artery disease include dyslipidemia.        Subjective   Max Ortiz is a 75 y.o. female.      The following portions of the patient's history were reviewed and updated as appropriate: allergies, current medications, past family history, past medical history, past social history, past surgical history and problem list.    Problem List  There is no problem list on file for this patient.      Past Medical History  Past Medical History:   Diagnosis Date   • Allergic    • Arthritis    • Cataract    • Hypertension    • Osteoporosis        Surgical History  Past Surgical History:   Procedure Laterality Date   • BREAST CYST EXCISION     • HYSTERECTOMY         Family History  Family History   Problem Relation Age of Onset   • Hypertension Father    • Diabetes Father    • Colon cancer Father    • Alzheimer's disease Mother    • Hypertension Mother    • Heart disease Mother    • Colon cancer Paternal Aunt    • Breast cancer Cousin        Social History  Social History    Tobacco Use      Smoking status: Never Smoker      Smokeless tobacco: Never Used       Is the Patient a current tobacco user? No    Allergies  Allergies   Allergen Reactions   • Aspirin Other (See Comments)     UPSETS STOMACH   • Penicillins Other (See Comments)     RED AND SWOLLEN AT INJECTION SITE       Current Medications    Current Outpatient Medications:   •  amLODIPine (NORVASC) 10 MG tablet, Take 1 tablet by mouth Daily., Disp: 90 tablet, Rfl: 1  •  difluprednate (Durezol) 0.05 % ophthalmic emulsion, 1 drop 4 (Four) Times a Day., Disp: , Rfl:   •  dorzolamide-timolol (COSOPT)  22.3-6.8 MG/ML ophthalmic solution, INSTILL 1 DROP INTO EACH EYE TWICE DAILY, Disp: , Rfl:   •  famotidine (Pepcid) 20 MG tablet, Take 1 tablet by mouth 2 (Two) Times a Day., Disp: 60 tablet, Rfl: 1  •  hydrALAZINE (APRESOLINE) 25 MG tablet, Take 1 tablet by mouth 2 (Two) Times a Day., Disp: 60 tablet, Rfl: 5  •  lisinopril (PRINIVIL,ZESTRIL) 40 MG tablet, Take 1 tablet by mouth Daily., Disp: 90 tablet, Rfl: 2  •  LORazepam (Ativan) 0.5 MG tablet, Take 1 tablet by mouth Every 8 (Eight) Hours As Needed for Anxiety., Disp: 14 tablet, Rfl: 1  •  Rhopressa 0.02 % solution, INSTILL 1 DROP INTO EACH EYE EVERY DAY AT BEDTIME, Disp: , Rfl:      Review of System  Review of Systems   Constitutional: Negative.    Eyes: Negative.    Respiratory: Negative.    Cardiovascular: Negative.    Gastrointestinal: Negative.    Endocrine: Negative.      I have reviewed and confirmed the accuracy of the ROS as documented by the MA/LPN/RN Chintan Townsend MD    Vitals:    10/12/21 1057   BP: 130/88   Resp: 16     Body mass index is 25.79 kg/m².    Objective     Physical Exam  Physical Exam  Constitutional:       Appearance: Normal appearance.   HENT:      Head: Normocephalic.   Cardiovascular:      Rate and Rhythm: Normal rate and regular rhythm.      Pulses: Normal pulses.      Heart sounds: Normal heart sounds.   Pulmonary:      Effort: Pulmonary effort is normal.      Breath sounds: Normal breath sounds.   Neurological:      Mental Status: She is alert.         Assessment/Plan      This pleasant joint pain.  In the interim of visit she was seen by Iliana Newell and Brittney for our problems and she relates that she was told that she had some inflammation in her eye.    With the last office visit we discontinued her HCTZ.    She has been under increased stress with the diagnosis of Alzheimer's and her  has had some difficulty sleeping.  We started her on lorazepam for short period.  She relates that she is getting good benefit from  that now she is able to get to sleep quickly and has no drowsiness during the day.  She relates that she has not used the lorazepam for at least 3 to 4 weeks.    We will try to obtain the note from [facility] regarding the patient's inflammation in her eyes.  She has had bilateral knee pain in the past.  And she relates mild occasional aching in her wrist.  She is concerned however about rheumatoid arthritis.    We will x-ray her wrist/hands and evaluate a rheumatoid profile to address her concerns and see her back in follow-up.        Patient's blood pressure was well controlled today at 130/88.    Diagnoses and all orders for this visit:    1. Arthralgia of both knees (Primary)  -     Uric Acid  -     PATRICIA  -     C-reactive protein  -     Cancel: Rheumatoid factor  -     Cyclic Citrul Peptide Antibody, IgG / IgA  -     Sedimentation rate, automated    2. Bilateral hand pain  -     XR Hand 3+ View Bilateral (In Office); Future    3. Primary hypertension             Chintan Townsend MD  10/12/2021

## 2021-11-08 ENCOUNTER — OFFICE VISIT (OUTPATIENT)
Dept: FAMILY MEDICINE CLINIC | Facility: CLINIC | Age: 76
End: 2021-11-08

## 2021-11-08 VITALS
SYSTOLIC BLOOD PRESSURE: 130 MMHG | WEIGHT: 142 LBS | DIASTOLIC BLOOD PRESSURE: 88 MMHG | BODY MASS INDEX: 26.13 KG/M2 | HEIGHT: 62 IN | RESPIRATION RATE: 16 BRPM

## 2021-11-08 DIAGNOSIS — M25.541 ARTHRALGIA OF BOTH HANDS: Primary | Chronic | ICD-10-CM

## 2021-11-08 DIAGNOSIS — F41.9 ANXIETY: Chronic | ICD-10-CM

## 2021-11-08 DIAGNOSIS — M25.542 ARTHRALGIA OF BOTH HANDS: Primary | Chronic | ICD-10-CM

## 2021-11-08 DIAGNOSIS — I10 PRIMARY HYPERTENSION: Chronic | ICD-10-CM

## 2021-11-08 PROCEDURE — 99213 OFFICE O/P EST LOW 20 MIN: CPT | Performed by: INTERNAL MEDICINE

## 2021-11-08 RX ORDER — LOTEPREDNOL ETABONATE 10 MG/ML
SUSPENSION TOPICAL
COMMUNITY
Start: 2021-09-30

## 2021-11-08 NOTE — PROGRESS NOTES
Answers for HPI/ROS submitted by the patient on 11/6/2021  Please describe your symptoms.: follow-up.  Have you had these symptoms before?: Yes  How long have you been having these symptoms?: Greater than 2 weeks  Please list any medications you are currently taking for this condition.: none  Please describe any probable cause for these symptoms. : n/a  What is the primary reason for your visit?: Other    11/08/2021    CC: Joint Pain (f/u labs...no other issues)  .        HPI  History of Present Illness     Subjective   Max Ortiz is a 76 y.o. female.      The following portions of the patient's history were reviewed and updated as appropriate: allergies, current medications, past family history, past medical history, past social history, past surgical history and problem list.    Problem List  There is no problem list on file for this patient.      Past Medical History  Past Medical History:   Diagnosis Date   • Allergic    • Arthritis    • Cataract    • Hypertension    • Osteoporosis        Surgical History  Past Surgical History:   Procedure Laterality Date   • BREAST CYST EXCISION     • HYSTERECTOMY         Family History  Family History   Problem Relation Age of Onset   • Hypertension Father    • Diabetes Father    • Colon cancer Father    • Alzheimer's disease Mother    • Hypertension Mother    • Heart disease Mother    • Colon cancer Paternal Aunt    • Breast cancer Cousin        Social History  Social History    Tobacco Use      Smoking status: Never Smoker      Smokeless tobacco: Never Used       Is the Patient a current tobacco user? No    Allergies  Allergies   Allergen Reactions   • Aspirin Other (See Comments)     UPSETS STOMACH   • Penicillins Other (See Comments)     RED AND SWOLLEN AT INJECTION SITE       Current Medications    Current Outpatient Medications:   •  amLODIPine (NORVASC) 10 MG tablet, Take 1 tablet by mouth Daily., Disp: 90 tablet, Rfl: 1  •  dorzolamide-timolol (COSOPT)  22.3-6.8 MG/ML ophthalmic solution, INSTILL 1 DROP INTO EACH EYE TWICE DAILY, Disp: , Rfl:   •  famotidine (Pepcid) 20 MG tablet, Take 1 tablet by mouth 2 (Two) Times a Day., Disp: 60 tablet, Rfl: 1  •  hydrALAZINE (APRESOLINE) 25 MG tablet, Take 1 tablet by mouth 2 (Two) Times a Day., Disp: 60 tablet, Rfl: 5  •  lisinopril (PRINIVIL,ZESTRIL) 40 MG tablet, Take 1 tablet by mouth Daily., Disp: 90 tablet, Rfl: 2  •  LORazepam (Ativan) 0.5 MG tablet, Take 1 tablet by mouth Every 8 (Eight) Hours As Needed for Anxiety., Disp: 14 tablet, Rfl: 1  •  Loteprednol Etabonate (Inveltys) 1 % suspension ophthalmic suspension, , Disp: , Rfl:   •  Rhopressa 0.02 % solution, INSTILL 1 DROP INTO EACH EYE EVERY DAY AT BEDTIME, Disp: , Rfl:      Review of System  Review of Systems  I have reviewed and confirmed the accuracy of the ROS as documented by the MA/LPN/RN Chintan Townsend MD    Vitals:    11/08/21 1000   BP: 130/88   Resp: 16     Body mass index is 25.97 kg/m².    Objective     Physical Exam  Physical Exam    Assessment/Plan      This 36-year-old patient presents at this time for follow-up of joint pain.  She relates that the hand pain she experienced in the past is completely resolved.  She actually was more concerned because of her report she received from ophthalmology related that she may have some arthritis somewhere else in the body because of some inflammation they saw with her's.  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  We reviewed the blood test that we have gotten including an PATRICIA which was negative anti-CCP which was negative uric acid level which was negative and a ESR which was negative.  In the absence of any of these laboratory results in the absence of pain or discomfort I think that she was probably more concerned about the problem and possibility connection with arthritis.  Since then she relates she has been  started on a new medication which is helped greatly and she is much relieved at this point.    She is also under increased stress as she is caring for her  who has some Alzheimer's memory problems.    Her blood pressure is well controlled today at 130/84 the left arm sitting position standard cuff.    This medical document has been generated by a speech to text program. In doing so nonsensical words and phrases maybe inadvertently transcribed. Even though the text has been reviewed for accuracy there may be some words or phrases that escape editing. Please contact me for clarification on words of phrases that do not seem explicable.        Diagnoses and all orders for this visit:    1. Arthralgia of both hands (Primary)    2. Primary hypertension  Comments:  Stable    3. Anxiety  Comments:  Stable      Plan: Follow-up in 5 to 6 months.  We will see her for Medicare wellness review in 1 month.       Chintan Townsend MD  11/08/2021

## 2021-11-30 ENCOUNTER — IMMUNIZATION (OUTPATIENT)
Dept: VACCINE CLINIC | Facility: HOSPITAL | Age: 76
End: 2021-11-30

## 2021-11-30 PROCEDURE — 0004A HC ADM SARSCOV2 30MCG/0.3ML BOOSTER: CPT | Performed by: INTERNAL MEDICINE

## 2021-11-30 PROCEDURE — 91300 HC SARSCOV02 VAC 30MCG/0.3ML IM: CPT | Performed by: INTERNAL MEDICINE

## 2021-12-06 DIAGNOSIS — I10 ESSENTIAL HYPERTENSION: ICD-10-CM

## 2021-12-06 RX ORDER — AMLODIPINE BESYLATE 10 MG/1
TABLET ORAL
Qty: 90 TABLET | Refills: 0 | Status: SHIPPED | OUTPATIENT
Start: 2021-12-06 | End: 2022-02-28

## 2021-12-06 NOTE — TELEPHONE ENCOUNTER
Rx Refill Note  Requested Prescriptions     Pending Prescriptions Disp Refills   • amLODIPine (NORVASC) 10 MG tablet [Pharmacy Med Name: AMLODIPINE BESYLATE 10MG TABLET] 90 tablet 0     Sig: TAKE ONE TABLET BY MOUTH ONCE DAILY      Last office visit with prescribing clinician: 11/8/2021      Next office visit with prescribing clinician: 1/12/2022            Ramirez Fletcher MA  12/06/21, 08:28 EST

## 2022-01-12 ENCOUNTER — OFFICE VISIT (OUTPATIENT)
Dept: FAMILY MEDICINE CLINIC | Facility: CLINIC | Age: 77
End: 2022-01-12

## 2022-01-12 VITALS
DIASTOLIC BLOOD PRESSURE: 86 MMHG | BODY MASS INDEX: 26.13 KG/M2 | WEIGHT: 142 LBS | RESPIRATION RATE: 16 BRPM | HEIGHT: 62 IN | SYSTOLIC BLOOD PRESSURE: 130 MMHG

## 2022-01-12 DIAGNOSIS — I10 ESSENTIAL HYPERTENSION, BENIGN: Chronic | ICD-10-CM

## 2022-01-12 DIAGNOSIS — Z00.00 MEDICARE ANNUAL WELLNESS VISIT, SUBSEQUENT: Primary | ICD-10-CM

## 2022-01-12 DIAGNOSIS — Z13.0 SCREENING FOR IRON DEFICIENCY ANEMIA: ICD-10-CM

## 2022-01-12 DIAGNOSIS — E78.2 MODERATE MIXED HYPERLIPIDEMIA NOT REQUIRING STATIN THERAPY: ICD-10-CM

## 2022-01-12 PROCEDURE — 1170F FXNL STATUS ASSESSED: CPT | Performed by: INTERNAL MEDICINE

## 2022-01-12 PROCEDURE — G0439 PPPS, SUBSEQ VISIT: HCPCS | Performed by: INTERNAL MEDICINE

## 2022-01-12 PROCEDURE — 1159F MED LIST DOCD IN RCRD: CPT | Performed by: INTERNAL MEDICINE

## 2022-01-12 NOTE — PROGRESS NOTES
The ABCs of the Annual Wellness Visit  Subsequent Medicare Wellness Visit    Chief Complaint   Patient presents with   • Medicare Wellness-subsequent     ...no other issues      Subjective    History of Present Illness:  Max Ortiz is a 76 y.o. female who presents for a Subsequent Medicare Wellness Visit.    The following portions of the patient's history were reviewed and   updated as appropriate: allergies, current medications, past family history, past medical history, past social history, past surgical history and problem list.    Compared to one year ago, the patient feels her physical   health is the same.    Compared to one year ago, the patient feels her mental   health is better.    Recent Hospitalizations:  She was not admitted to the hospital during the last year.       Current Medical Providers:  Patient Care Team:  Chintan Townsend MD as PCP - General (Internal Medicine)    Outpatient Medications Prior to Visit   Medication Sig Dispense Refill   • amLODIPine (NORVASC) 10 MG tablet TAKE ONE TABLET BY MOUTH ONCE DAILY 90 tablet 0   • dorzolamide-timolol (COSOPT) 22.3-6.8 MG/ML ophthalmic solution INSTILL 1 DROP INTO EACH EYE TWICE DAILY     • hydrALAZINE (APRESOLINE) 25 MG tablet Take 1 tablet by mouth 2 (Two) Times a Day. 60 tablet 5   • lisinopril (PRINIVIL,ZESTRIL) 40 MG tablet Take 1 tablet by mouth Daily. 90 tablet 2   • Loteprednol Etabonate (Inveltys) 1 % suspension ophthalmic suspension      • Rhopressa 0.02 % solution INSTILL 1 DROP INTO EACH EYE EVERY DAY AT BEDTIME     • LORazepam (Ativan) 0.5 MG tablet Take 1 tablet by mouth Every 8 (Eight) Hours As Needed for Anxiety. 14 tablet 1   • famotidine (Pepcid) 20 MG tablet Take 1 tablet by mouth 2 (Two) Times a Day. 60 tablet 1     No facility-administered medications prior to visit.       No opioid medication identified on active medication list. I have reviewed chart for other potential  high risk medication/s and harmful drug  "interactions in the elderly.          Aspirin is not on active medication list.  Aspirin use is not indicated based on review of current medical condition/s. Risk of harm outweighs potential benefits.  .    There is no problem list on file for this patient.    Advance Care Planning  Advance Directive is not on file.  ACP discussion was held with the patient during this visit. Patient does not have an advance directive, information provided.    Review of Systems      Objective    Vitals:    01/12/22 1012   BP: 130/86   Resp: 16   Weight: 64.4 kg (142 lb)   Height: 157.5 cm (62\")     BMI Readings from Last 1 Encounters:   01/12/22 25.97 kg/m²   BMI is above normal parameters. Recommendations include: educational material    Does the patient have evidence of cognitive impairment? No    Physical Exam            HEALTH RISK ASSESSMENT    Smoking Status:  Social History     Tobacco Use   Smoking Status Never Smoker   Smokeless Tobacco Never Used     Alcohol Consumption:  Social History     Substance and Sexual Activity   Alcohol Use No     Fall Risk Screen:    STEADI Fall Risk Assessment was completed, and patient is at LOW risk for falls.Assessment completed on:1/12/2022    Depression Screening:  PHQ-2/PHQ-9 Depression Screening 1/12/2022   Little interest or pleasure in doing things 0   Feeling down, depressed, or hopeless 0   Total Score 0       Health Habits and Functional and Cognitive Screening:  Functional & Cognitive Status 1/12/2022   Do you have difficulty preparing food and eating? No   Do you have difficulty bathing yourself, getting dressed or grooming yourself? No   Do you have difficulty using the toilet? No   Do you have difficulty moving around from place to place? No   Do you have trouble with steps or getting out of a bed or a chair? No   Current Diet Well Balanced Diet   Dental Exam Up to date   Eye Exam Up to date   Exercise (times per week) -   Current Exercises Include Walking   Current Exercise " Activities Include -   Do you need help using the phone?  No   Are you deaf or do you have serious difficulty hearing?  No   Do you need help with transportation? No   Do you need help shopping? No   Do you need help preparing meals?  No   Do you need help with housework?  No   Do you need help with laundry? No   Do you need help taking your medications? No   Do you need help managing money? No   Do you ever drive or ride in a car without wearing a seat belt? No   Have you felt unusual stress, anger or loneliness in the last month? No   Who do you live with? Spouse   If you need help, do you have trouble finding someone available to you? No   Have you been bothered in the last four weeks by sexual problems? No   Do you have difficulty concentrating, remembering or making decisions? No       Age-appropriate Screening Schedule:  Refer to the list below for future screening recommendations based on patient's age, sex and/or medical conditions. Orders for these recommended tests are listed in the plan section. The patient has been provided with a written plan.    Health Maintenance   Topic Date Due   • ZOSTER VACCINE (1 of 2) Never done   • DXA SCAN  06/10/2022   • MAMMOGRAM  03/09/2023   • TDAP/TD VACCINES (2 - Td or Tdap) 12/22/2030   • INFLUENZA VACCINE  Completed              Assessment/Plan      This pleasant 76-year-old presents today for Medicare wellness review.  She is in good spirits today.  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    Regarding anticipatory guidance we discussed the importance of the COVID vaccine.  She has had all of her immunizations including booster.            CMS Preventative Services Quick Reference  Risk Factors Identified During Encounter  None Identified  The above risks/problems have been discussed with the patient.  Follow up actions/plans if indicated are seen below in the Assessment/Plan  Section.  Pertinent information has been shared with the patient in the After Visit Summary.    There are no diagnoses linked to this encounter.    Follow Up:   No follow-ups on file.      Plan:  1.)  Follow-up in 5 to 6 months  2.)  Continue amlodipine hydralazine and lisinopril for hypertension.          An After Visit Summary and PPPS were made available to the patient.

## 2022-01-13 LAB
ALBUMIN SERPL-MCNC: 4.2 G/DL (ref 3.7–4.7)
ALBUMIN/GLOB SERPL: 1.3 {RATIO} (ref 1.2–2.2)
ALP SERPL-CCNC: 95 IU/L (ref 44–121)
ALT SERPL-CCNC: 12 IU/L (ref 0–32)
AST SERPL-CCNC: 13 IU/L (ref 0–40)
BASOPHILS # BLD AUTO: 0 X10E3/UL (ref 0–0.2)
BASOPHILS NFR BLD AUTO: 1 %
BILIRUB SERPL-MCNC: 0.3 MG/DL (ref 0–1.2)
BUN SERPL-MCNC: 20 MG/DL (ref 8–27)
BUN/CREAT SERPL: 20 (ref 12–28)
CALCIUM SERPL-MCNC: 10.1 MG/DL (ref 8.7–10.3)
CHLORIDE SERPL-SCNC: 104 MMOL/L (ref 96–106)
CHOLEST SERPL-MCNC: 235 MG/DL (ref 100–199)
CHOLEST/HDLC SERPL: 2.6 RATIO (ref 0–4.4)
CO2 SERPL-SCNC: 27 MMOL/L (ref 20–29)
CREAT SERPL-MCNC: 1.02 MG/DL (ref 0.57–1)
EOSINOPHIL # BLD AUTO: 0.1 X10E3/UL (ref 0–0.4)
EOSINOPHIL NFR BLD AUTO: 1 %
ERYTHROCYTE [DISTWIDTH] IN BLOOD BY AUTOMATED COUNT: 11.2 % (ref 11.7–15.4)
GLOBULIN SER CALC-MCNC: 3.3 G/DL (ref 1.5–4.5)
GLUCOSE SERPL-MCNC: 105 MG/DL (ref 65–99)
HCT VFR BLD AUTO: 32.7 % (ref 34–46.6)
HDLC SERPL-MCNC: 90 MG/DL
HGB BLD-MCNC: 10.8 G/DL (ref 11.1–15.9)
IMM GRANULOCYTES # BLD AUTO: 0 X10E3/UL (ref 0–0.1)
IMM GRANULOCYTES NFR BLD AUTO: 0 %
LDLC SERPL CALC-MCNC: 128 MG/DL (ref 0–99)
LYMPHOCYTES # BLD AUTO: 1.3 X10E3/UL (ref 0.7–3.1)
LYMPHOCYTES NFR BLD AUTO: 30 %
MCH RBC QN AUTO: 30.3 PG (ref 26.6–33)
MCHC RBC AUTO-ENTMCNC: 33 G/DL (ref 31.5–35.7)
MCV RBC AUTO: 92 FL (ref 79–97)
MONOCYTES # BLD AUTO: 0.5 X10E3/UL (ref 0.1–0.9)
MONOCYTES NFR BLD AUTO: 11 %
NEUTROPHILS # BLD AUTO: 2.6 X10E3/UL (ref 1.4–7)
NEUTROPHILS NFR BLD AUTO: 57 %
PLATELET # BLD AUTO: 258 X10E3/UL (ref 150–450)
POTASSIUM SERPL-SCNC: 4.6 MMOL/L (ref 3.5–5.2)
PROT SERPL-MCNC: 7.5 G/DL (ref 6–8.5)
RBC # BLD AUTO: 3.57 X10E6/UL (ref 3.77–5.28)
SODIUM SERPL-SCNC: 143 MMOL/L (ref 134–144)
TRIGL SERPL-MCNC: 99 MG/DL (ref 0–149)
VLDLC SERPL CALC-MCNC: 17 MG/DL (ref 5–40)
WBC # BLD AUTO: 4.5 X10E3/UL (ref 3.4–10.8)

## 2022-02-28 DIAGNOSIS — I10 ESSENTIAL HYPERTENSION: ICD-10-CM

## 2022-02-28 RX ORDER — AMLODIPINE BESYLATE 10 MG/1
TABLET ORAL
Qty: 90 TABLET | Refills: 0 | Status: SHIPPED | OUTPATIENT
Start: 2022-02-28 | End: 2022-05-23

## 2022-03-14 DIAGNOSIS — I10 ESSENTIAL HYPERTENSION, BENIGN: ICD-10-CM

## 2022-03-14 RX ORDER — HYDRALAZINE HYDROCHLORIDE 25 MG/1
TABLET, FILM COATED ORAL
Qty: 60 TABLET | Refills: 5 | Status: SHIPPED | OUTPATIENT
Start: 2022-03-14 | End: 2022-09-12

## 2022-03-16 ENCOUNTER — APPOINTMENT (OUTPATIENT)
Dept: WOMENS IMAGING | Facility: HOSPITAL | Age: 77
End: 2022-03-16

## 2022-03-16 ENCOUNTER — PROCEDURE VISIT (OUTPATIENT)
Dept: OBSTETRICS AND GYNECOLOGY | Facility: CLINIC | Age: 77
End: 2022-03-16

## 2022-03-16 DIAGNOSIS — Z12.31 VISIT FOR SCREENING MAMMOGRAM: Primary | ICD-10-CM

## 2022-03-16 PROCEDURE — 77067 SCR MAMMO BI INCL CAD: CPT | Performed by: OBSTETRICS & GYNECOLOGY

## 2022-03-16 PROCEDURE — 77067 SCR MAMMO BI INCL CAD: CPT | Performed by: RADIOLOGY

## 2022-03-16 PROCEDURE — 77063 BREAST TOMOSYNTHESIS BI: CPT | Performed by: OBSTETRICS & GYNECOLOGY

## 2022-03-16 PROCEDURE — 77063 BREAST TOMOSYNTHESIS BI: CPT | Performed by: RADIOLOGY

## 2022-03-16 NOTE — PROGRESS NOTES
Physical Therapy Initial Evaluation and Plan of Care        Subjective Evaluation    History of Present Illness  Mechanism of injury: Patient reports that she has noticed increasing pain in her left shoulder over the course of the past year. She mentioned it to her physician during a routine check up and she was referred to ortho who dx her with RCT. She reports that she is having difficulty with overhead activities and abduction (reaching to the side).   Mild pain at rest and some achiness in wrist.  Had a cortisone shot in early September which has helped some.      Patient Occupation: retired Quality of life: good    Pain  Current pain ratin  At worst pain ratin  Quality: tight, dull ache, sharp and radiating  Relieving factors: heat and medications (tylenol PRN)  Aggravating factors: outstretched reach and overhead activity  Progression: improved    Hand dominance: right    Diagnostic Tests  X-ray: abnormal    Treatments  Previous treatment: medication  Patient Goals  Patient goals for therapy: increased strength, independence with ADLs/IADLs, decreased pain and increased motion  Patient goal: Patient would like to be able to sew painfree.            Objective       Postural Observations  Seated posture: fair  Standing posture: fair        Palpation   Left   Tenderness of the biceps, levator scapulae and upper trapezius.     Tenderness     Left Shoulder   Tenderness in the acromion, bicipital groove, subacromial bursa and supraspinatus tendon.     Cervical/Thoracic Screen   Cervical range of motion within normal limits    Active Range of Motion   Left Shoulder   Flexion: 85 degrees   Abduction: 35 degrees   External rotation 45°: 60 degrees     Passive Range of Motion   Left Shoulder   Flexion: 85 degrees   Abduction: 35 degrees   External rotation 45°: 60 degrees     Scapular Mobility   Left Shoulder   Scapular mobility: fair  Scapular Mobility with Shoulder to 90° FF   Scapular elevation:  moderate    Joint Play   Left Shoulder  Hypomobile in the anterior capsule and posterior capsule.    Strength/Myotome Testing     Left Shoulder     Planes of Motion   Flexion: 3+   Abduction: 3+   External rotation at 0°: 3+   Internal rotation at 0°: 3+     Tests     Additional Tests Details  Unable to perform special testing due to limited AROM         Assessment & Plan     Assessment  Impairments: abnormal or restricted ROM, activity intolerance, lacks appropriate home exercise program and pain with function  Assessment details: Patient is a 72 year old female who presents with increased left shoulder pain. Upon evaluation she has significant limited AROM of left shoulder. Forward head and bilateral shoulder internal rotation.  Increased tenderness to palpation at SA space and bicipital groove. Strength was equal throughout however limited in all planes secondary to pain.  She has significant tightness at upper trap and levator. Special testing was unable to be performed due to limited AROM.    Prognosis: good  Functional Limitations: carrying objects, lifting, pulling, pushing, uncomfortable because of pain, reaching overhead and unable to perform repetitive tasks  Goals  Plan Goals: Long Term Goals (12 weeks)    Patient is able to return to work/community activities with minimal to no discomfort  Patient is able to work overhead as needed for work/community activities.   Patient is able to perform sewing without an increase in pain.       Short Term Goals (6 weeks)    Patient is independent with HEP  Patient is able to sleep without being disrupted by pain.   Patient has full AROM/PROM of left shoulder.   Patient has greater than 50% improvement overall.   Patient is able to perform bathing and grooming activities with use of involved UE extremity.                   Plan  Therapy options: will be seen for skilled physical therapy services  Planned modality interventions: TENS, ultrasound, thermotherapy  (hydrocollator packs) and cryotherapy  Planned therapy interventions: manual therapy, soft tissue mobilization, strengthening, stretching, therapeutic activities, joint mobilization, home exercise program, functional ROM exercises, flexibility and body mechanics training  Frequency: 3x week  Duration in weeks: 12  Treatment plan discussed with: patient        Manual Therapy:    8     mins  95729;  Therapeutic Exercise:    25     mins  [id number];       Timed Treatment:   33   mins   Total Treatment:     55   mins    PT SIGNATURE: Raysa Arredondo, PT   DATE TREATMENT INITIATED: 9/25/2018    Initial Certification  Certification Period: 12/24/2018  I certify that the therapy services are furnished while this patient is under my care.  The services outlined above are required by this patient, and will be reviewed every 90 days.     PHYSICIAN:       DATE:     Please sign and return via fax to [phone].. Thank you, [facility].       Hemostasis: Electrocautery

## 2022-05-17 DIAGNOSIS — F41.9 ANXIETY: ICD-10-CM

## 2022-05-17 RX ORDER — LORAZEPAM 0.5 MG/1
TABLET ORAL
Qty: 14 TABLET | Refills: 0 | Status: SHIPPED | OUTPATIENT
Start: 2022-05-17 | End: 2022-12-15 | Stop reason: SDUPTHER

## 2022-05-17 NOTE — TELEPHONE ENCOUNTER
PATIENT IS CALLING IN TO CHECK ON THE STATUS OF HER LORAZEPAM 0.5MG    PATIENT CALL BACK  [phone]  PHARMACY  [facility]  [address] [phone]

## 2022-05-17 NOTE — TELEPHONE ENCOUNTER
Rx Refill Note  Requested Prescriptions     Pending Prescriptions Disp Refills   • LORazepam (ATIVAN) 0.5 MG tablet [Pharmacy Med Name: LORazepam 0.5 MG Oral Tablet] 14 tablet 0     Sig: TAKE 1 TABLET BY MOUTH EVERY 8 HOURS AS NEEDED FOR ANXIETY      Last office visit with prescribing clinician: 1/12/2022      Next office visit with prescribing clinician: 6/15/2022            Nhi Doty MA  05/17/22, 15:50 EDT

## 2022-05-23 DIAGNOSIS — I10 ESSENTIAL HYPERTENSION: ICD-10-CM

## 2022-05-23 RX ORDER — AMLODIPINE BESYLATE 10 MG/1
TABLET ORAL
Qty: 90 TABLET | Refills: 0 | Status: SHIPPED | OUTPATIENT
Start: 2022-05-23 | End: 2022-08-30

## 2022-06-15 ENCOUNTER — OFFICE VISIT (OUTPATIENT)
Dept: FAMILY MEDICINE CLINIC | Facility: CLINIC | Age: 77
End: 2022-06-15

## 2022-06-15 VITALS
SYSTOLIC BLOOD PRESSURE: 130 MMHG | BODY MASS INDEX: 25.76 KG/M2 | WEIGHT: 140 LBS | HEIGHT: 62 IN | DIASTOLIC BLOOD PRESSURE: 90 MMHG | RESPIRATION RATE: 16 BRPM

## 2022-06-15 DIAGNOSIS — L98.9 SKIN LESION OF LEFT LOWER LIMB: ICD-10-CM

## 2022-06-15 DIAGNOSIS — I10 ESSENTIAL HYPERTENSION: Primary | Chronic | ICD-10-CM

## 2022-06-15 PROCEDURE — 99213 OFFICE O/P EST LOW 20 MIN: CPT | Performed by: INTERNAL MEDICINE

## 2022-06-15 RX ORDER — TRIAMCINOLONE ACETONIDE 1 MG/G
1 CREAM TOPICAL 2 TIMES DAILY
Qty: 30 G | Refills: 1 | Status: SHIPPED | OUTPATIENT
Start: 2022-06-15

## 2022-06-20 DIAGNOSIS — I10 ESSENTIAL HYPERTENSION: ICD-10-CM

## 2022-06-20 RX ORDER — LISINOPRIL 40 MG/1
TABLET ORAL
Qty: 90 TABLET | Refills: 2 | Status: SHIPPED | OUTPATIENT
Start: 2022-06-20 | End: 2023-03-14 | Stop reason: SDUPTHER

## 2022-06-30 NOTE — PROGRESS NOTES
06/15/2022    CC: Hypertension (F/U) and sore (Upper left thigh x 2months)  .        HPI  This 76-year-old patient presents at this time for follow-up of hypertension.  She currently is on amlodipine 10 mg p.o. daily, hydralazine 25 mg twice daily and lisinopril 40 mg p.o. daily.  She denies any recent headaches shortness of breath or chest pain.       Subjective   Max Ortiz is a 76 y.o. female.      The following portions of the patient's history were reviewed and updated as appropriate: allergies, current medications, past family history, past medical history, past social history, past surgical history and problem list.    Problem List  There is no problem list on file for this patient.      Past Medical History  Past Medical History:   Diagnosis Date   • Allergic    • Arthritis    • Cataract    • Hypertension    • Osteoporosis        Surgical History  Past Surgical History:   Procedure Laterality Date   • BREAST CYST EXCISION     • HYSTERECTOMY         Family History  Family History   Problem Relation Age of Onset   • Hypertension Father    • Diabetes Father    • Colon cancer Father    • Alzheimer's disease Mother    • Hypertension Mother    • Heart disease Mother    • Colon cancer Paternal Aunt    • Breast cancer Cousin        Social History  Social History    Tobacco Use      Smoking status: Never Smoker      Smokeless tobacco: Never Used       Is the Patient a current tobacco user? No    Allergies  Allergies   Allergen Reactions   • Aspirin Other (See Comments)     UPSETS STOMACH   • Penicillins Other (See Comments)     RED AND SWOLLEN AT INJECTION SITE       Current Medications    Current Outpatient Medications:   •  amLODIPine (NORVASC) 10 MG tablet, TAKE ONE TABLET BY MOUTH ONCE DAILY, Disp: 90 tablet, Rfl: 0  •  dorzolamide-timolol (COSOPT) 22.3-6.8 MG/ML ophthalmic solution, INSTILL 1 DROP INTO EACH EYE TWICE DAILY, Disp: , Rfl:   •  hydrALAZINE (APRESOLINE) 25 MG tablet, TAKE ONE (1) TABLET BY  MOUTH TWO (2) (TWO) TIMES A DAY., Disp: 60 tablet, Rfl: 5  •  LORazepam (ATIVAN) 0.5 MG tablet, TAKE 1 TABLET BY MOUTH EVERY 8 HOURS AS NEEDED FOR ANXIETY, Disp: 14 tablet, Rfl: 0  •  Loteprednol Etabonate (Inveltys) 1 % suspension ophthalmic suspension, , Disp: , Rfl:   •  Rhopressa 0.02 % solution, INSTILL 1 DROP INTO EACH EYE EVERY DAY AT BEDTIME, Disp: , Rfl:   •  lisinopril (PRINIVIL,ZESTRIL) 40 MG tablet, TAKE ONE (1) TABLET BY MOUTH DAILY., Disp: 90 tablet, Rfl: 2  •  triamcinolone (KENALOG) 0.1 % cream, Apply 1 application topically to the appropriate area as directed 2 (Two) Times a Day., Disp: 30 g, Rfl: 1     Review of System  Review of Systems   Constitutional: Negative.    Eyes: Negative.    Respiratory: Negative.    Cardiovascular: Negative.    Gastrointestinal: Negative.      I have reviewed and confirmed the accuracy of the ROS as documented by the MA/LPN/RN Chintan Townsend MD    Vitals:    06/15/22 1039   BP: 130/90   Resp: 16     Body mass index is 25.61 kg/m².    Objective     Physical Exam  Physical Exam  HENT:      Nose: Nose normal.   Cardiovascular:      Rate and Rhythm: Normal rate and regular rhythm.      Pulses: Normal pulses.      Heart sounds: Normal heart sounds.   Pulmonary:      Effort: Pulmonary effort is normal.      Breath sounds: Normal breath sounds.   Skin:     General: Skin is warm and dry.      Findings: Lesion present.      Comments: 2 to 3 cm papular area along the left outer thigh.  It is nontender been present she relates for about a month.         Assessment & Plan      This pleasant 76-year-old who is been under much stress recently secondary to the health of her  presents at this time for follow-up of hypertension.  She relates she is taking her medication as prescribed.  She relates no chest pain or shortness of breath.  Her blood pressure was initially 130/90 on recheck by me in the left arm sitting position standard cuff was 126/80.  She also complains of a  bump on her left hip that she relates is nontender but she is concerned about it.  She is use triple antibiotic cream but is not improved.  She relates that it is occasionally itchy.    Diagnoses and all orders for this visit:    1. Essential hypertension (Primary)  Comments:  Controlled, at goal    2. Skin lesion of left lower limb  Comments:  New onset mildly pruritic    Other orders  -     triamcinolone (KENALOG) 0.1 % cream; Apply 1 application topically to the appropriate area as directed 2 (Two) Times a Day.  Dispense: 30 g; Refill: 1      Plan:  1.)  Triamcinolone 0.1% cream to be applied to the skin lesion twice daily.  2.)  Follow-up in the next 5 to 6 months.  3.)  If the skin lesion is not improved significantly in 2 to 3 weeks she will contact me for possible referral to dermatology.       Chintan Townsend MD  06/15/2022

## 2022-08-29 DIAGNOSIS — I10 ESSENTIAL HYPERTENSION: ICD-10-CM

## 2022-08-30 RX ORDER — AMLODIPINE BESYLATE 10 MG/1
10 TABLET ORAL DAILY
Qty: 90 TABLET | Refills: 0 | OUTPATIENT
Start: 2022-08-30

## 2022-08-30 RX ORDER — AMLODIPINE BESYLATE 10 MG/1
TABLET ORAL
Qty: 90 TABLET | Refills: 0 | Status: SHIPPED | OUTPATIENT
Start: 2022-08-30 | End: 2022-11-28

## 2022-09-12 DIAGNOSIS — I10 ESSENTIAL HYPERTENSION, BENIGN: ICD-10-CM

## 2022-09-12 RX ORDER — HYDRALAZINE HYDROCHLORIDE 25 MG/1
TABLET, FILM COATED ORAL
Qty: 60 TABLET | Refills: 5 | Status: SHIPPED | OUTPATIENT
Start: 2022-09-12 | End: 2023-03-14 | Stop reason: SDUPTHER

## 2022-09-12 RX ORDER — HYDRALAZINE HYDROCHLORIDE 25 MG/1
TABLET, FILM COATED ORAL
Qty: 60 TABLET | Refills: 5 | OUTPATIENT
Start: 2022-09-12

## 2022-09-26 ENCOUNTER — IMMUNIZATION (OUTPATIENT)
Dept: FAMILY MEDICINE CLINIC | Facility: CLINIC | Age: 77
End: 2022-09-26

## 2022-09-26 DIAGNOSIS — Z23 NEED FOR VACCINATION: Primary | ICD-10-CM

## 2022-09-26 PROCEDURE — 0124A COVID-19 (PFIZER) BIVALENT BOOSTER 12+YRS: CPT | Performed by: INTERNAL MEDICINE

## 2022-09-26 PROCEDURE — 91312 COVID-19 (PFIZER) BIVALENT BOOSTER 12+YRS: CPT | Performed by: INTERNAL MEDICINE

## 2022-10-14 ENCOUNTER — APPOINTMENT (OUTPATIENT)
Dept: GENERAL RADIOLOGY | Facility: HOSPITAL | Age: 77
End: 2022-10-14

## 2022-10-14 ENCOUNTER — HOSPITAL ENCOUNTER (EMERGENCY)
Facility: HOSPITAL | Age: 77
Discharge: HOME | End: 2022-10-14
Attending: EMERGENCY MEDICINE | Admitting: EMERGENCY MEDICINE

## 2022-10-14 VITALS
DIASTOLIC BLOOD PRESSURE: 78 MMHG | RESPIRATION RATE: 18 BRPM | OXYGEN SATURATION: 99 % | HEART RATE: 68 BPM | TEMPERATURE: 98.9 F | SYSTOLIC BLOOD PRESSURE: 112 MMHG

## 2022-10-14 DIAGNOSIS — R07.89 CHEST WALL PAIN: ICD-10-CM

## 2022-10-14 DIAGNOSIS — R07.9 RIGHT-SIDED CHEST PAIN: Primary | ICD-10-CM

## 2022-10-14 DIAGNOSIS — I10 HYPERTENSION, UNSPECIFIED TYPE: ICD-10-CM

## 2022-10-14 LAB
ALBUMIN SERPL-MCNC: 4.4 G/DL (ref 3.5–5.2)
ALBUMIN/GLOB SERPL: 1.3 G/DL
ALP SERPL-CCNC: 92 U/L (ref 39–117)
ALT SERPL W P-5'-P-CCNC: 14 U/L (ref 1–33)
ANION GAP SERPL CALCULATED.3IONS-SCNC: 8.4 MMOL/L (ref 5–15)
AST SERPL-CCNC: 14 U/L (ref 1–32)
BASOPHILS # BLD AUTO: 0.02 10*3/MM3 (ref 0–0.2)
BASOPHILS NFR BLD AUTO: 0.4 % (ref 0–1.5)
BILIRUB SERPL-MCNC: 0.5 MG/DL (ref 0–1.2)
BUN SERPL-MCNC: 15 MG/DL (ref 8–23)
BUN/CREAT SERPL: 14.7 (ref 7–25)
CALCIUM SPEC-SCNC: 10 MG/DL (ref 8.6–10.5)
CHLORIDE SERPL-SCNC: 104 MMOL/L (ref 98–107)
CO2 SERPL-SCNC: 24.6 MMOL/L (ref 22–29)
CREAT SERPL-MCNC: 1.02 MG/DL (ref 0.57–1)
DEPRECATED RDW RBC AUTO: 37.6 FL (ref 37–54)
EGFRCR SERPLBLD CKD-EPI 2021: 57.5 ML/MIN/1.73
EOSINOPHIL # BLD AUTO: 0.04 10*3/MM3 (ref 0–0.4)
EOSINOPHIL NFR BLD AUTO: 0.9 % (ref 0.3–6.2)
ERYTHROCYTE [DISTWIDTH] IN BLOOD BY AUTOMATED COUNT: 11 % (ref 12.3–15.4)
GLOBULIN UR ELPH-MCNC: 3.3 GM/DL
GLUCOSE SERPL-MCNC: 106 MG/DL (ref 65–99)
HCT VFR BLD AUTO: 34 % (ref 34–46.6)
HGB BLD-MCNC: 11.1 G/DL (ref 12–15.9)
IMM GRANULOCYTES # BLD AUTO: 0.02 10*3/MM3 (ref 0–0.05)
IMM GRANULOCYTES NFR BLD AUTO: 0.4 % (ref 0–0.5)
LYMPHOCYTES # BLD AUTO: 1.12 10*3/MM3 (ref 0.7–3.1)
LYMPHOCYTES NFR BLD AUTO: 25 % (ref 19.6–45.3)
MCH RBC QN AUTO: 29.8 PG (ref 26.6–33)
MCHC RBC AUTO-ENTMCNC: 32.6 G/DL (ref 31.5–35.7)
MCV RBC AUTO: 91.2 FL (ref 79–97)
MONOCYTES # BLD AUTO: 0.43 10*3/MM3 (ref 0.1–0.9)
MONOCYTES NFR BLD AUTO: 9.6 % (ref 5–12)
NEUTROPHILS NFR BLD AUTO: 2.85 10*3/MM3 (ref 1.7–7)
NEUTROPHILS NFR BLD AUTO: 63.7 % (ref 42.7–76)
NRBC BLD AUTO-RTO: 0 /100 WBC (ref 0–0.2)
NT-PROBNP SERPL-MCNC: 102 PG/ML (ref 0–1800)
PLATELET # BLD AUTO: 219 10*3/MM3 (ref 140–450)
PMV BLD AUTO: 9.2 FL (ref 6–12)
POTASSIUM SERPL-SCNC: 3.7 MMOL/L (ref 3.5–5.2)
PROT SERPL-MCNC: 7.7 G/DL (ref 6–8.5)
QT INTERVAL: 372 MS
RBC # BLD AUTO: 3.73 10*6/MM3 (ref 3.77–5.28)
SODIUM SERPL-SCNC: 137 MMOL/L (ref 136–145)
TROPONIN T SERPL-MCNC: <0.01 NG/ML (ref 0–0.03)
TROPONIN T SERPL-MCNC: <0.01 NG/ML (ref 0–0.03)
WBC NRBC COR # BLD: 4.48 10*3/MM3 (ref 3.4–10.8)

## 2022-10-14 PROCEDURE — 83880 ASSAY OF NATRIURETIC PEPTIDE: CPT | Performed by: EMERGENCY MEDICINE

## 2022-10-14 PROCEDURE — 93010 ELECTROCARDIOGRAM REPORT: CPT | Performed by: INTERNAL MEDICINE

## 2022-10-14 PROCEDURE — 84484 ASSAY OF TROPONIN QUANT: CPT | Performed by: EMERGENCY MEDICINE

## 2022-10-14 PROCEDURE — 71045 X-RAY EXAM CHEST 1 VIEW: CPT

## 2022-10-14 PROCEDURE — 93005 ELECTROCARDIOGRAM TRACING: CPT

## 2022-10-14 PROCEDURE — 99284 EMERGENCY DEPT VISIT MOD MDM: CPT

## 2022-10-14 PROCEDURE — 85025 COMPLETE CBC W/AUTO DIFF WBC: CPT | Performed by: EMERGENCY MEDICINE

## 2022-10-14 PROCEDURE — 80053 COMPREHEN METABOLIC PANEL: CPT | Performed by: EMERGENCY MEDICINE

## 2022-10-14 PROCEDURE — 36415 COLL VENOUS BLD VENIPUNCTURE: CPT

## 2022-10-14 RX ORDER — HYDROCODONE BITARTRATE AND ACETAMINOPHEN 5; 325 MG/1; MG/1
1 TABLET ORAL EVERY 6 HOURS PRN
Status: DISCONTINUED | OUTPATIENT
Start: 2022-10-14 | End: 2022-10-14 | Stop reason: HOSPADM

## 2022-10-14 RX ORDER — SODIUM CHLORIDE 0.9 % (FLUSH) 0.9 %
10 SYRINGE (ML) INJECTION AS NEEDED
Status: DISCONTINUED | OUTPATIENT
Start: 2022-10-14 | End: 2022-10-14 | Stop reason: HOSPADM

## 2022-10-14 RX ADMIN — HYDROCODONE BITARTRATE AND ACETAMINOPHEN 1 TABLET: 5; 325 TABLET ORAL at 14:22

## 2022-10-14 NOTE — ED PROVIDER NOTES
EMERGENCY DEPARTMENT ENCOUNTER    Room Number:  13/13  Date of encounter:  10/14/2022  PCP: Chintan Townsend MD  Historian: Patient, EMS    I used full protective equipment while examining this patient.  This includes face mask, gloves and protective eyewear.  I washed my hands before entering the room and immediately upon leaving the room.  Patient was wearing a surgical mask.       HPI:  Chief Complaint: Right-sided chest pain  A complete HPI/ROS/PMH/PSH/SH/FH are unobtainable due to: None    Context: Max Ortiz is a 76 y.o. female who presents to the ED from home by EMS c/o right-sided chest pain that began around 7 AM today.  Pain is described as dull and aching.  Pain radiates into the right upper back.  Pain is worse with movement of the trunk and right arm.  She took Tylenol without relief.  Patient denies nausea, vomiting, sweating, or shortness of breath.  Pain was initially moderate and is now mild.  Patient went shopping yesterday with her , who has dementia.  Her  was supposed to be pushing the shopping cart but the patient says she ended up having to pull the cart with her right arm.  Denies recent illness, cough, fever, abdominal pain, leg pain, leg swelling, palpitations, dizziness, or syncope.  Patient has a history of hypertension.  Denies history of hyperlipidemia, diabetes, CAD, tobacco use, or family history of early CAD.  EMS reports that the patient seemed anxious.  Her her blood pressure was initially 180/90 but improved in route.  Patient declined nitroglycerin..    PAST MEDICAL HISTORY  Active Ambulatory Problems     Diagnosis Date Noted   • No Active Ambulatory Problems     Resolved Ambulatory Problems     Diagnosis Date Noted   • No Resolved Ambulatory Problems     Past Medical History:   Diagnosis Date   • Allergic    • Arthritis    • Cataract    • Hypertension    • Osteoporosis          PAST SURGICAL HISTORY  Past Surgical History:   Procedure Laterality Date    • BREAST CYST EXCISION     • HYSTERECTOMY           FAMILY HISTORY  Family History   Problem Relation Age of Onset   • Hypertension Father    • Diabetes Father    • Colon cancer Father    • Alzheimer's disease Mother    • Hypertension Mother    • Heart disease Mother    • Colon cancer Paternal Aunt    • Breast cancer Cousin          SOCIAL HISTORY  Social History     Socioeconomic History   • Marital status:    Tobacco Use   • Smoking status: Never   • Smokeless tobacco: Never   Substance and Sexual Activity   • Alcohol use: No   • Drug use: No   • Sexual activity: Defer         ALLERGIES  Aspirin and Penicillins       REVIEW OF SYSTEMS  Review of Systems      All systems have been reviewed and are negative except as as discussed in the HPI    PHYSICAL EXAM    I have reviewed the triage vital signs and nursing notes.    ED Triage Vitals [10/14/22 0958]   Temp Heart Rate Resp BP SpO2   98.9 °F (37.2 °C) 76 18 126/80 100 %      Temp src Heart Rate Source Patient Position BP Location FiO2 (%)   -- -- -- -- --       Physical Exam  GENERAL: Awake, alert, oriented x3.  Well-developed, well-nourished elderly female.  Resting comfortably no acute distress  HENT: NCAT, nares patent, moist mucous membranes  EYES: Extraocular muscles intact, no scleral icterus  CV: regular rhythm, regular rate, equal radial pulses bilaterally RESPIRATORY: normal effort, clear to auscultation bilaterally  ABDOMEN: soft, nontender  MUSCULOSKELETAL: There is tenderness over the right anterior chest wall and right upper back.  No crepitus.  Pain is reproducible with movement of the trunk and right arm.  Extremities are nontender and without obvious deformity.  There is no calf tenderness or pedal edema  NEURO: Speech is normal.  No facial droop.  Follows commands.  SKIN: warm, dry, no rash  PSYCH: Normal mood and affect      LAB RESULTS  Recent Results (from the past 24 hour(s))   ECG 12 Lead    Collection Time: 10/14/22 10:12 AM   Result  Value Ref Range    QT Interval 372 ms   Comprehensive Metabolic Panel    Collection Time: 10/14/22 10:21 AM    Specimen: Blood   Result Value Ref Range    Glucose 106 (H) 65 - 99 mg/dL    BUN 15 8 - 23 mg/dL    Creatinine 1.02 (H) 0.57 - 1.00 mg/dL    Sodium 137 136 - 145 mmol/L    Potassium 3.7 3.5 - 5.2 mmol/L    Chloride 104 98 - 107 mmol/L    CO2 24.6 22.0 - 29.0 mmol/L    Calcium 10.0 8.6 - 10.5 mg/dL    Total Protein 7.7 6.0 - 8.5 g/dL    Albumin 4.40 3.50 - 5.20 g/dL    ALT (SGPT) 14 1 - 33 U/L    AST (SGOT) 14 1 - 32 U/L    Alkaline Phosphatase 92 39 - 117 U/L    Total Bilirubin 0.5 0.0 - 1.2 mg/dL    Globulin 3.3 gm/dL    A/G Ratio 1.3 g/dL    BUN/Creatinine Ratio 14.7 7.0 - 25.0    Anion Gap 8.4 5.0 - 15.0 mmol/L    eGFR 57.5 (L) >60.0 mL/min/1.73   BNP    Collection Time: 10/14/22 10:21 AM    Specimen: Blood   Result Value Ref Range    proBNP 102.0 0.0 - 1,800.0 pg/mL   Troponin    Collection Time: 10/14/22 10:21 AM    Specimen: Blood   Result Value Ref Range    Troponin T <0.010 0.000 - 0.030 ng/mL   CBC Auto Differential    Collection Time: 10/14/22 10:21 AM    Specimen: Blood   Result Value Ref Range    WBC 4.48 3.40 - 10.80 10*3/mm3    RBC 3.73 (L) 3.77 - 5.28 10*6/mm3    Hemoglobin 11.1 (L) 12.0 - 15.9 g/dL    Hematocrit 34.0 34.0 - 46.6 %    MCV 91.2 79.0 - 97.0 fL    MCH 29.8 26.6 - 33.0 pg    MCHC 32.6 31.5 - 35.7 g/dL    RDW 11.0 (L) 12.3 - 15.4 %    RDW-SD 37.6 37.0 - 54.0 fl    MPV 9.2 6.0 - 12.0 fL    Platelets 219 140 - 450 10*3/mm3    Neutrophil % 63.7 42.7 - 76.0 %    Lymphocyte % 25.0 19.6 - 45.3 %    Monocyte % 9.6 5.0 - 12.0 %    Eosinophil % 0.9 0.3 - 6.2 %    Basophil % 0.4 0.0 - 1.5 %    Immature Grans % 0.4 0.0 - 0.5 %    Neutrophils, Absolute 2.85 1.70 - 7.00 10*3/mm3    Lymphocytes, Absolute 1.12 0.70 - 3.10 10*3/mm3    Monocytes, Absolute 0.43 0.10 - 0.90 10*3/mm3    Eosinophils, Absolute 0.04 0.00 - 0.40 10*3/mm3    Basophils, Absolute 0.02 0.00 - 0.20 10*3/mm3    Immature Grans,  Absolute 0.02 0.00 - 0.05 10*3/mm3    nRBC 0.0 0.0 - 0.2 /100 WBC   Troponin    Collection Time: 10/14/22 12:29 PM    Specimen: Blood   Result Value Ref Range    Troponin T <0.010 0.000 - 0.030 ng/mL       Ordered the above labs and independently reviewed the results.      RADIOLOGY  XR Chest 1 View    Result Date: 10/14/2022  XR CHEST 1 VW-  Clinical: Right-sided chest pain  COMPARISON examination 2/23/2020  FINDINGS: Heart size within normal limits. No mediastinal or hilar abnormality. Lungs are clear. No effusion or edema.  CONCLUSION: No active disease of the chest  This report was finalized on 10/14/2022 10:59 AM by Dr. Jesus Manuel Cunningham M.D.        I ordered the above noted radiological studies. Reviewed by me and discussed with radiologist.  See dictation for official radiology interpretation.      PROCEDURES  Procedures      MEDICATIONS GIVEN IN ER    Medications   sodium chloride 0.9 % flush 10 mL (has no administration in time range)   HYDROcodone-acetaminophen (NORCO) 5-325 MG per tablet 1 tablet ( Oral Canceled Entry 10/14/22 1423)         PROGRESS, DATA ANALYSIS, CONSULTS, AND MEDICAL DECISION MAKING    All labs have been independently reviewed by me.  All radiology studies have been reviewed by me and discussed with radiologist dictating the report.   EKG's independently viewed and interpreted by me.  I have reviewed the nurse's notes, vital signs, past medical history, and medication list.  Discussion below represents my analysis of pertinent findings related to patient's condition, differential diagnosis, treatment plan and final disposition.    DDx includes but is not limited to acute coronary syndrome, pulmonary embolism, thoracic aortic dissection, pneumonia, pneumothorax, musculoskeletal pain, GERD or esophageal spasm, anxiety, myocarditis/pericarditis, esophageal rupture, pancreatitis.         ED Course as of 10/14/22 1457   Fri Oct 14, 2022   1001 Old records reviewed.  Patient was last seen here  in the ED in February 2020 for atypical chest pain and left arm pain. [WH]   1020 EKG          EKG time: 10:12 AM  Rhythm/Rate: Sinus rhythm, rate 82  P waves and IN: Normal  QRS, axis: Normal  ST and T waves: Normal    Interpreted Contemporaneously by me at 10:15 AM, independently viewed  EKG is not significantly changed compared to prior EKG done on 2/23/2020   [WH]   1020 Patient presents to the ED complaining of right upper chest pain that radiates into the right upper back.  Pain is reproducible.  Pain is atypical.  EKG is unchanged.  We will obtain labs and chest x-ray for further evaluation.   [WH]   1057 Hemoglobin(!): 11.1  Stable [WH]   1104 Chest x-ray is negative acute [WH]   1114 Troponin T: <0.010 [WH]   1350 Troponin T: <0.010 [WH]   1416 Patient is resting comfortably.  Test results were discussed with her.  She still reports mild pain in her right upper chest.  Troponin is negative x2.  Pain is atypical and reproducible.  EKG is unchanged.  Patient will be discharged.  Return precautions were discussed. [WH]      ED Course User Index  [] Larry Rodriguez MD       AS OF 14:57 EDT VITALS:    BP - 112/78  HR - 68  TEMP - 98.9 °F (37.2 °C)  O2 SATS - 99%      DIAGNOSIS  Final diagnoses:   Right-sided chest pain   Chest wall pain   Hypertension, unspecified type         DISPOSITION  DISCHARGE    Patient discharged in stable condition.    Reviewed implications of results, diagnosis, meds, responsibility to follow up, warning signs and symptoms of possible worsening, potential complications and reasons to return to ER, including worsening or persistent pain, pain radiating to neck/jaw/arm, nausea, vomiting, sweating, shortness of breath, or other concern..    Patient/Family voiced understanding of above instructions.    Discussed plan for discharge, as there is no emergent indication for admission. Patient referred to primary care provider for BP management due to today's BP. Pt/family is agreeable  and understands need for follow up and repeat testing.  Pt is aware that discharge does not mean that nothing is wrong but it indicates no emergency is present that requires admission and they must continue care with follow-up as given below or physician of their choice.     FOLLOW-UP  Chintan Townsend MD  [address] [facility] [address] [phone]    Schedule an appointment as soon as possible for a visit            Medication List      No changes were made to your prescriptions during this visit.           Dictated utilizing Dragon dictation     Larry Rodriguez MD  10/14/22 2677

## 2022-10-14 NOTE — ED TRIAGE NOTES
Pt arrived via ems from home with complaints of right sided chest pain that  Radiates to her back. Pt reports pain 1/10 at this time.     Pt was wearing a mask during assessment.  This RN wore appropriate PPE

## 2022-10-14 NOTE — DISCHARGE INSTRUCTIONS
Take Tylenol as needed.  Apply heating pad to affected area.  Return to the emergency department for worsening pain, shortness of breath, nausea, vomiting, pain radiating to neck/jaw/arm, sweating, or other concern.  Follow-up with your primary care provider if symptoms persist.

## 2022-11-28 DIAGNOSIS — I10 ESSENTIAL HYPERTENSION: ICD-10-CM

## 2022-11-28 RX ORDER — AMLODIPINE BESYLATE 10 MG/1
TABLET ORAL
Qty: 90 TABLET | Refills: 0 | Status: SHIPPED | OUTPATIENT
Start: 2022-11-28 | End: 2023-02-22 | Stop reason: SDUPTHER

## 2022-12-15 ENCOUNTER — OFFICE VISIT (OUTPATIENT)
Dept: FAMILY MEDICINE CLINIC | Facility: CLINIC | Age: 77
End: 2022-12-15

## 2022-12-15 VITALS
BODY MASS INDEX: 25.95 KG/M2 | DIASTOLIC BLOOD PRESSURE: 90 MMHG | SYSTOLIC BLOOD PRESSURE: 140 MMHG | WEIGHT: 141 LBS | HEIGHT: 62 IN

## 2022-12-15 DIAGNOSIS — F41.9 ANXIETY: ICD-10-CM

## 2022-12-15 DIAGNOSIS — I10 ESSENTIAL HYPERTENSION: Primary | ICD-10-CM

## 2022-12-15 PROCEDURE — 99214 OFFICE O/P EST MOD 30 MIN: CPT | Performed by: INTERNAL MEDICINE

## 2022-12-18 RX ORDER — LORAZEPAM 0.5 MG/1
0.5 TABLET ORAL NIGHTLY PRN
Qty: 30 TABLET | Refills: 0 | Status: SHIPPED | OUTPATIENT
Start: 2022-12-18

## 2022-12-19 NOTE — PROGRESS NOTES
12/15/2022    CC: Hypertension (F/U) and Arthritis (Bilat hands.  --no other issues)  .        HPI  Hypertension  This is a chronic problem. The current episode started today. The problem is unchanged. The problem is controlled. There are no associated agents to hypertension.   Arthritis         Subjective   Max Ortiz is a 77 y.o. female.      The following portions of the patient's history were reviewed and updated as appropriate: allergies, current medications, past family history, past medical history, past social history, past surgical history and problem list.    Problem List  There is no problem list on file for this patient.      Past Medical History  Past Medical History:   Diagnosis Date   • Allergic    • Arthritis    • Cataract    • Hypertension    • Osteoporosis        Surgical History  Past Surgical History:   Procedure Laterality Date   • BREAST CYST EXCISION     • HYSTERECTOMY         Family History  Family History   Problem Relation Age of Onset   • Hypertension Father    • Diabetes Father    • Colon cancer Father    • Alzheimer's disease Mother    • Hypertension Mother    • Heart disease Mother    • Colon cancer Paternal Aunt    • Breast cancer Cousin        Social History  Social History    Tobacco Use      Smoking status: Never      Smokeless tobacco: Never       Is the Patient a current tobacco user? No    Allergies  Allergies   Allergen Reactions   • Aspirin Other (See Comments)     UPSETS STOMACH   • Penicillins Other (See Comments)     RED AND SWOLLEN AT INJECTION SITE       Current Medications    Current Outpatient Medications:   •  amLODIPine (NORVASC) 10 MG tablet, TAKE ONE TABLET BY MOUTH ONCE DAILY, Disp: 90 tablet, Rfl: 0  •  brimonidine (ALPHAGAN P) 0.1 % solution ophthalmic solution, Every 8 (Eight) Hours., Disp: , Rfl:   •  dorzolamide-timolol (COSOPT) 22.3-6.8 MG/ML ophthalmic solution, INSTILL 1 DROP INTO EACH EYE TWICE DAILY, Disp: , Rfl:   •  hydrALAZINE (APRESOLINE) 25  MG tablet, TAKE ONE (1) TABLET BY MOUTH TWO (2) (TWO) TIMES A DAY., Disp: 60 tablet, Rfl: 5  •  lisinopril (PRINIVIL,ZESTRIL) 40 MG tablet, TAKE ONE (1) TABLET BY MOUTH DAILY., Disp: 90 tablet, Rfl: 2  •  LORazepam (ATIVAN) 0.5 MG tablet, TAKE 1 TABLET BY MOUTH EVERY 8 HOURS AS NEEDED FOR ANXIETY, Disp: 14 tablet, Rfl: 0  •  Loteprednol Etabonate (Inveltys) 1 % suspension ophthalmic suspension, , Disp: , Rfl:   •  Rhopressa 0.02 % solution, INSTILL 1 DROP INTO EACH EYE EVERY DAY AT BEDTIME, Disp: , Rfl:   •  triamcinolone (KENALOG) 0.1 % cream, Apply 1 application topically to the appropriate area as directed 2 (Two) Times a Day., Disp: 30 g, Rfl: 1     Review of System  Review of Systems   Eyes: Negative.    Respiratory: Negative.    Cardiovascular: Negative.    Endocrine: Negative.    Musculoskeletal: Positive for arthritis.     I have reviewed and confirmed the accuracy of the ROS as documented by the MA/LPN/RN Chintan Townsend MD    Vitals:    12/15/22 1519   BP: 140/90     Body mass index is 25.79 kg/m².    Objective     Physical Exam  Physical Exam  HENT:      Head: Normocephalic.   Cardiovascular:      Rate and Rhythm: Normal rate and regular rhythm.      Pulses: Normal pulses.   Musculoskeletal:      Cervical back: Normal range of motion.   Neurological:      Mental Status: She is alert.   Psychiatric:         Mood and Affect: Mood normal.         Behavior: Behavior normal.         Thought Content: Thought content normal.         Judgment: Judgment normal.         Assessment & Plan    This 77-year-old patient presents at this time for evaluation for anxiety and hypertension.  She relates she is taking her blood pressure medicine as prescribed.  He has not had any chest pain or shortness of breath.  She has been under much stress her as her  who has Alzheimer's and recently admitted to the hospital.  He was admitted for apparently of failure to thrive she had decided to place him in hospice but after  feeding he began to show more evidence of awareness and so she decided to take him out of hospice.  They will be placed at feeding tube in the next few days.  She relates she has been having some trouble sleeping.    Her blood pressure was initially elevated 140/90 but on recheck by me in the left arm sitting position standard cuff was 130/80.  Her weight is essentially unchanged from the last visit back in June.    Total time of the visit was 30 minutes we discussed the care of the feeding tube and advised her that they will be covering this in more detail prior to her 's discharge.  Time for the visit was taken by review of the chart.  Discussion of the patient regarding the stresses that she is under and arriving at the decision to continue her on her anxiety medication.    At this point she feels that 1 tablet at bedtime is helping her to get better sleep and she does not think she will need it during the day.      Diagnoses and all orders for this visit:    1. Essential hypertension (Primary)    2. Anxiety      Plan:  1.)  Continue lorazepam 0.5 mg 1 tab p.o. nightly  2.)  We will reevaluate in the next several weeks.       Chintan Townsend MD  12/15/2022

## 2023-02-22 DIAGNOSIS — I10 ESSENTIAL HYPERTENSION: ICD-10-CM

## 2023-02-22 RX ORDER — AMLODIPINE BESYLATE 10 MG/1
10 TABLET ORAL DAILY
Qty: 90 TABLET | Refills: 1 | Status: SHIPPED | OUTPATIENT
Start: 2023-02-22

## 2023-03-14 DIAGNOSIS — I10 ESSENTIAL HYPERTENSION, BENIGN: ICD-10-CM

## 2023-03-14 DIAGNOSIS — I10 ESSENTIAL HYPERTENSION: ICD-10-CM

## 2023-03-14 RX ORDER — LISINOPRIL 40 MG/1
40 TABLET ORAL DAILY
Qty: 90 TABLET | Refills: 1 | Status: SHIPPED | OUTPATIENT
Start: 2023-03-14

## 2023-03-14 RX ORDER — HYDRALAZINE HYDROCHLORIDE 25 MG/1
25 TABLET, FILM COATED ORAL 2 TIMES DAILY
Qty: 60 TABLET | Refills: 5 | Status: SHIPPED | OUTPATIENT
Start: 2023-03-14 | End: 2023-03-23 | Stop reason: SDUPTHER

## 2023-03-17 ENCOUNTER — PROCEDURE VISIT (OUTPATIENT)
Dept: OBSTETRICS AND GYNECOLOGY | Facility: CLINIC | Age: 78
End: 2023-03-17
Payer: MEDICARE

## 2023-03-17 DIAGNOSIS — Z12.31 VISIT FOR SCREENING MAMMOGRAM: Primary | ICD-10-CM

## 2023-03-17 PROCEDURE — 77063 BREAST TOMOSYNTHESIS BI: CPT | Performed by: OBSTETRICS & GYNECOLOGY

## 2023-03-17 PROCEDURE — 77067 SCR MAMMO BI INCL CAD: CPT | Performed by: OBSTETRICS & GYNECOLOGY

## 2023-03-23 DIAGNOSIS — I10 ESSENTIAL HYPERTENSION, BENIGN: ICD-10-CM

## 2023-03-23 RX ORDER — HYDRALAZINE HYDROCHLORIDE 25 MG/1
25 TABLET, FILM COATED ORAL 2 TIMES DAILY
Qty: 180 TABLET | Refills: 1 | Status: SHIPPED | OUTPATIENT
Start: 2023-03-23

## 2023-04-10 ENCOUNTER — OFFICE VISIT (OUTPATIENT)
Dept: FAMILY MEDICINE CLINIC | Facility: CLINIC | Age: 78
End: 2023-04-10
Payer: MEDICARE

## 2023-04-10 VITALS
SYSTOLIC BLOOD PRESSURE: 128 MMHG | DIASTOLIC BLOOD PRESSURE: 84 MMHG | OXYGEN SATURATION: 96 % | WEIGHT: 140 LBS | HEART RATE: 65 BPM | HEIGHT: 63 IN | BODY MASS INDEX: 24.8 KG/M2

## 2023-04-10 DIAGNOSIS — Z13.820 SCREENING FOR OSTEOPOROSIS: ICD-10-CM

## 2023-04-10 DIAGNOSIS — Z12.11 ENCOUNTER FOR SCREENING COLONOSCOPY: ICD-10-CM

## 2023-04-10 DIAGNOSIS — Z09 ENCOUNTER FOR FOLLOW-UP EXAMINATION AFTER COMPLETED TREATMENT FOR CONDITIONS OTHER THAN MALIGNANT NEOPLASM: ICD-10-CM

## 2023-04-10 DIAGNOSIS — R35.1 NOCTURIA: ICD-10-CM

## 2023-04-10 DIAGNOSIS — Z13.0 SCREENING FOR DEFICIENCY ANEMIA: ICD-10-CM

## 2023-04-10 DIAGNOSIS — Z00.00 MEDICARE ANNUAL WELLNESS VISIT, SUBSEQUENT: Primary | ICD-10-CM

## 2023-04-10 DIAGNOSIS — I10 ESSENTIAL HYPERTENSION, BENIGN: ICD-10-CM

## 2023-04-10 DIAGNOSIS — Z13.29 SCREENING FOR HYPOTHYROIDISM: ICD-10-CM

## 2023-04-10 DIAGNOSIS — Z13.220 SCREENING FOR HYPERLIPIDEMIA: ICD-10-CM

## 2023-04-10 PROCEDURE — G0439 PPPS, SUBSEQ VISIT: HCPCS | Performed by: INTERNAL MEDICINE

## 2023-04-10 PROCEDURE — 1170F FXNL STATUS ASSESSED: CPT | Performed by: INTERNAL MEDICINE

## 2023-04-10 PROCEDURE — 1160F RVW MEDS BY RX/DR IN RCRD: CPT | Performed by: INTERNAL MEDICINE

## 2023-04-10 PROCEDURE — 1159F MED LIST DOCD IN RCRD: CPT | Performed by: INTERNAL MEDICINE

## 2023-04-10 NOTE — PROGRESS NOTES
The ABCs of the Annual Wellness Visit  Subsequent Medicare Wellness Visit    Chief Complaint   Patient presents with   • Medicare Wellness-subsequent   • Hip Pain     Fell a week ago and left hip has been hurting her .... no other issues       Subjective    History of Present Illness:  Max Ortiz is a 77 y.o. female who presents for a Subsequent Medicare Wellness Visit.    The following portions of the patient's history were reviewed and   updated as appropriate: allergies, current medications, past family history, past medical history, past social history, past surgical history and problem list.    Compared to one year ago, the patient feels her physical   health is worse.    Compared to one year ago, the patient feels her mental   health is worse.    Recent Hospitalizations:  She was not admitted to the hospital during the last year.       Current Medical Providers:  Patient Care Team:  Chintan Townsend MD as PCP - General (Internal Medicine)    Outpatient Medications Prior to Visit   Medication Sig Dispense Refill   • amLODIPine (NORVASC) 10 MG tablet Take 1 tablet by mouth Daily. 90 tablet 1   • brimonidine (ALPHAGAN P) 0.1 % solution ophthalmic solution Every 8 (Eight) Hours.     • dorzolamide-timolol (COSOPT) 22.3-6.8 MG/ML ophthalmic solution INSTILL 1 DROP INTO EACH EYE TWICE DAILY     • hydrALAZINE (APRESOLINE) 25 MG tablet Take 1 tablet by mouth 2 (Two) Times a Day. 180 tablet 1   • lisinopril (PRINIVIL,ZESTRIL) 40 MG tablet Take 1 tablet by mouth Daily. 90 tablet 1   • LORazepam (ATIVAN) 0.5 MG tablet Take 1 tablet by mouth At Night As Needed (1 tablet at bed as needed). for anxiety 30 tablet 0   • Rhopressa 0.02 % solution INSTILL 1 DROP INTO EACH EYE EVERY DAY AT BEDTIME     • triamcinolone (KENALOG) 0.1 % cream Apply 1 application topically to the appropriate area as directed 2 (Two) Times a Day. 30 g 1   • loratadine (CLARITIN) 10 MG tablet Take 1 tablet by mouth Daily. 30 tablet 0   •  "Loteprednol Etabonate (Inveltys) 1 % suspension ophthalmic suspension        No facility-administered medications prior to visit.       No opioid medication identified on active medication list. I have reviewed chart for other potential  high risk medication/s and harmful drug interactions in the elderly.          Aspirin is not on active medication list.  Aspirin use is not indicated based on review of current medical condition/s. Risk of harm outweighs potential benefits.  .    There is no problem list on file for this patient.    Advance Care Planning  Advance Directive is not on file.  ACP discussion was held with the patient during this visit. Patient does not have an advance directive, information provided.    Review of Systems   Constitutional: Negative.    HENT: Negative.    Eyes: Negative.    Respiratory: Negative.    Cardiovascular: Negative.    Gastrointestinal: Negative.    Musculoskeletal: Negative.    Skin: Negative.    Psychiatric/Behavioral: Negative.          Objective    Vitals:    04/10/23 1428   BP: 128/84   BP Location: Left arm   Pulse: 65   SpO2: 96%   Weight: 63.5 kg (140 lb)   Height: 158.8 cm (62.52\")   PainSc:   5   PainLoc: Hand  Comment: Arthritis     BMI Readings from Last 1 Encounters:   04/10/23 25.18 kg/m²   BMI is above normal parameters. Recommendations include: educational material    Does the patient have evidence of cognitive impairment? No    Physical Exam  Vitals and nursing note reviewed.   Constitutional:       Appearance: She is well-developed.   HENT:      Head: Normocephalic and atraumatic.   Eyes:      Conjunctiva/sclera: Conjunctivae normal.   Cardiovascular:      Rate and Rhythm: Normal rate and regular rhythm.      Heart sounds: Normal heart sounds.   Pulmonary:      Effort: Pulmonary effort is normal.      Breath sounds: Normal breath sounds.   Abdominal:      General: Bowel sounds are normal.      Palpations: Abdomen is soft.   Musculoskeletal:         General: " Normal range of motion.      Cervical back: Normal range of motion and neck supple.   Skin:     General: Skin is warm and dry.   Neurological:      Mental Status: She is alert and oriented to person, place, and time.   Psychiatric:         Behavior: Behavior normal.                 HEALTH RISK ASSESSMENT    Smoking Status:  Social History     Tobacco Use   Smoking Status Never   Smokeless Tobacco Never     Alcohol Consumption:  Social History     Substance and Sexual Activity   Alcohol Use No     Fall Risk Screen:    HANNAH Fall Risk Assessment was completed, and patient is at MODERATE risk for falls. Assessment completed on:4/10/2023    Depression Screening:  PHQ-2/PHQ-9 Depression Screening 4/10/2023   Retired PHQ-9 Total Score -   Retired Total Score -   Little Interest or Pleasure in Doing Things 0-->not at all   Feeling Down, Depressed or Hopeless 1-->several days   PHQ-9: Brief Depression Severity Measure Score 1       Health Habits and Functional and Cognitive Screening:  Functional & Cognitive Status 4/10/2023   Do you have difficulty preparing food and eating? No   Do you have difficulty bathing yourself, getting dressed or grooming yourself? No   Do you have difficulty using the toilet? No   Do you have difficulty moving around from place to place? No   Do you have trouble with steps or getting out of a bed or a chair? No   Current Diet Well Balanced Diet   Dental Exam Up to date   Eye Exam Up to date   Exercise (times per week) 3 times per week   Current Exercises Include Walking   Current Exercise Activities Include -   Do you need help using the phone?  No   Are you deaf or do you have serious difficulty hearing?  No   Do you need help with transportation? No   Do you need help shopping? No   Do you need help preparing meals?  No   Do you need help with housework?  No   Do you need help with laundry? No   Do you need help taking your medications? No   Do you need help managing money? No   Do you ever  drive or ride in a car without wearing a seat belt? No   Have you felt unusual stress, anger or loneliness in the last month? Yes   Who do you live with? Alone   If you need help, do you have trouble finding someone available to you? No   Have you been bothered in the last four weeks by sexual problems? -   Do you have difficulty concentrating, remembering or making decisions? No       Age-appropriate Screening Schedule:  Refer to the list below for future screening recommendations based on patient's age, sex and/or medical conditions. Orders for these recommended tests are listed in the plan section. The patient has been provided with a written plan.    Health Maintenance   Topic Date Due   • Pneumococcal Vaccine 65+ (1 - PCV) Never done   • COLORECTAL CANCER SCREENING  03/07/2022   • DXA SCAN  06/10/2022   • LIPID PANEL  01/12/2023   • ZOSTER VACCINE (1 of 2) 06/15/2023 (Originally 10/23/1996)   • INFLUENZA VACCINE  08/01/2023   • ANNUAL WELLNESS VISIT  04/10/2024   • MAMMOGRAM  03/17/2025   • TDAP/TD VACCINES (2 - Td or Tdap) 12/22/2030   • HEPATITIS C SCREENING  Completed   • COVID-19 Vaccine  Completed              Assessment & Plan      This 77-year-old patient presents today for Medicare wellness review.  She relates she is feeling fine.  In the interim of visit she lost her  of greater than 20 years.  He passed away at home after hospitalization for pneumonia.    Regarding anticipatory guidance.  We discussed the importance of keeping her LDL cholesterol lower than 100.  We gave a low-cholesterol diet sheet with instructions to adhere to it after our discussion about it.  She is not fasting today and she will come back in the next several days for fasting blood test.  CMS Preventative Services Quick Reference  Risk Factors Identified During Encounter  None Identified  The above risks/problems have been discussed with the patient.  Follow up actions/plans if indicated are seen below in the  Assessment/Plan Section.  Pertinent information has been shared with the patient in the After Visit Summary.    Diagnoses and all orders for this visit:    1. Medicare annual wellness visit, subsequent (Primary)    2. Essential hypertension, benign  -     Comprehensive Metabolic Panel    3. Encounter for screening colonoscopy  -     Ambulatory Referral For Screening Colonoscopy    4. Screening for osteoporosis  -     DEXA Bone Density Axial; Future    5. Screening for hyperlipidemia  -     Lipid Panel With / Chol / HDL Ratio    6. Screening for deficiency anemia  -     CBC & Differential    7. Nocturia  -     Urinalysis With Culture If Indicated -    8. Screening for hypothyroidism  -     T4, Free    9. Encounter for follow-up examination after completed treatment for conditions other than malignant neoplasm  -     DEXA Bone Density Axial; Future        Follow Up:   No follow-ups on file.     An After Visit Summary and PPPS were made available to the patient.

## 2023-04-11 ENCOUNTER — TELEPHONE (OUTPATIENT)
Dept: FAMILY MEDICINE CLINIC | Facility: CLINIC | Age: 78
End: 2023-04-11

## 2023-04-11 NOTE — TELEPHONE ENCOUNTER
"Caller: Max Ortiz \"Max Carpio\"    Relationship: Self    Best call back number: [phone]    What is the best time to reach you: ANYTIME    Who are you requesting to speak with (clinical staff, provider,  specific staff member): CLINICAL     What was the call regarding: PATIENT STATES SHE WAS TOLD THAT HER COLONOSCOPY COULD NOT BE SCHEDULED AND WAS REFERRED BACK TO HER PCP OFFICE.     PLEASE ADVISE.     Do you require a callback: YES  "

## 2023-04-14 LAB
ALBUMIN SERPL-MCNC: 4.4 G/DL (ref 3.5–5.2)
ALBUMIN/GLOB SERPL: 1.6 G/DL
ALP SERPL-CCNC: 105 U/L (ref 39–117)
ALT SERPL-CCNC: 12 U/L (ref 1–33)
AST SERPL-CCNC: 11 U/L (ref 1–32)
BASOPHILS # BLD AUTO: 0.04 10*3/MM3 (ref 0–0.2)
BASOPHILS NFR BLD AUTO: 0.9 % (ref 0–1.5)
BILIRUB SERPL-MCNC: 0.4 MG/DL (ref 0–1.2)
BUN SERPL-MCNC: 17 MG/DL (ref 8–23)
BUN/CREAT SERPL: 16.3 (ref 7–25)
CALCIUM SERPL-MCNC: 10.1 MG/DL (ref 8.6–10.5)
CHLORIDE SERPL-SCNC: 105 MMOL/L (ref 98–107)
CHOLEST SERPL-MCNC: 207 MG/DL (ref 0–200)
CHOLEST/HDLC SERPL: 2.25 {RATIO}
CO2 SERPL-SCNC: 28.6 MMOL/L (ref 22–29)
CREAT SERPL-MCNC: 1.04 MG/DL (ref 0.57–1)
EGFRCR SERPLBLD CKD-EPI 2021: 55.8 ML/MIN/1.73
EOSINOPHIL # BLD AUTO: 0.06 10*3/MM3 (ref 0–0.4)
EOSINOPHIL NFR BLD AUTO: 1.3 % (ref 0.3–6.2)
ERYTHROCYTE [DISTWIDTH] IN BLOOD BY AUTOMATED COUNT: 11.3 % (ref 12.3–15.4)
GLOBULIN SER CALC-MCNC: 2.7 GM/DL
GLUCOSE SERPL-MCNC: 91 MG/DL (ref 65–99)
HCT VFR BLD AUTO: 33.9 % (ref 34–46.6)
HDLC SERPL-MCNC: 92 MG/DL (ref 40–60)
HGB BLD-MCNC: 10.9 G/DL (ref 12–15.9)
IMM GRANULOCYTES # BLD AUTO: 0.01 10*3/MM3 (ref 0–0.05)
IMM GRANULOCYTES NFR BLD AUTO: 0.2 % (ref 0–0.5)
LDLC SERPL CALC-MCNC: 104 MG/DL (ref 0–100)
LYMPHOCYTES # BLD AUTO: 1.19 10*3/MM3 (ref 0.7–3.1)
LYMPHOCYTES NFR BLD AUTO: 26 % (ref 19.6–45.3)
MCH RBC QN AUTO: 30.3 PG (ref 26.6–33)
MCHC RBC AUTO-ENTMCNC: 32.2 G/DL (ref 31.5–35.7)
MCV RBC AUTO: 94.2 FL (ref 79–97)
MONOCYTES # BLD AUTO: 0.57 10*3/MM3 (ref 0.1–0.9)
MONOCYTES NFR BLD AUTO: 12.4 % (ref 5–12)
NEUTROPHILS # BLD AUTO: 2.71 10*3/MM3 (ref 1.7–7)
NEUTROPHILS NFR BLD AUTO: 59.2 % (ref 42.7–76)
NRBC BLD AUTO-RTO: 0 /100 WBC (ref 0–0.2)
PLATELET # BLD AUTO: 223 10*3/MM3 (ref 140–450)
POTASSIUM SERPL-SCNC: 4.3 MMOL/L (ref 3.5–5.2)
PROT SERPL-MCNC: 7.1 G/DL (ref 6–8.5)
RBC # BLD AUTO: 3.6 10*6/MM3 (ref 3.77–5.28)
SODIUM SERPL-SCNC: 142 MMOL/L (ref 136–145)
T4 FREE SERPL-MCNC: 1.21 NG/DL (ref 0.93–1.7)
TRIGL SERPL-MCNC: 63 MG/DL (ref 0–150)
VLDLC SERPL CALC-MCNC: 11 MG/DL (ref 5–40)
WBC # BLD AUTO: 4.58 10*3/MM3 (ref 3.4–10.8)

## 2023-05-18 ENCOUNTER — PREP FOR SURGERY (OUTPATIENT)
Dept: OTHER | Facility: HOSPITAL | Age: 78
End: 2023-05-18
Payer: MEDICARE

## 2023-05-18 DIAGNOSIS — Z12.11 SCREENING FOR MALIGNANT NEOPLASM OF COLON: Primary | ICD-10-CM

## 2023-06-13 ENCOUNTER — HOSPITAL ENCOUNTER (OUTPATIENT)
Dept: PET IMAGING | Facility: HOSPITAL | Age: 78
Discharge: HOME | End: 2023-06-13
Admitting: INTERNAL MEDICINE
Payer: MEDICARE

## 2023-06-13 DIAGNOSIS — Z13.820 SCREENING FOR OSTEOPOROSIS: ICD-10-CM

## 2023-06-13 DIAGNOSIS — Z09 ENCOUNTER FOR FOLLOW-UP EXAMINATION AFTER COMPLETED TREATMENT FOR CONDITIONS OTHER THAN MALIGNANT NEOPLASM: ICD-10-CM

## 2023-06-13 PROCEDURE — 77080 DXA BONE DENSITY AXIAL: CPT

## 2023-06-13 PROCEDURE — 77080 DXA BONE DENSITY AXIAL: CPT | Performed by: RADIOLOGY

## 2023-06-20 NOTE — PROGRESS NOTES
Inform the patient that her DEXA scan shows that she has osteopenia.  Have her make an appointment to come in so we can discuss this

## 2023-08-09 PROBLEM — Z12.11 SCREENING FOR MALIGNANT NEOPLASM OF COLON: Status: ACTIVE | Noted: 2023-08-09

## 2023-08-17 DIAGNOSIS — I10 ESSENTIAL HYPERTENSION: ICD-10-CM

## 2023-08-17 RX ORDER — AMLODIPINE BESYLATE 10 MG/1
TABLET ORAL
Qty: 90 TABLET | Refills: 0 | Status: SHIPPED | OUTPATIENT
Start: 2023-08-17

## 2023-08-17 RX ORDER — LISINOPRIL 40 MG/1
TABLET ORAL
Qty: 90 TABLET | Refills: 0 | Status: SHIPPED | OUTPATIENT
Start: 2023-08-17

## 2023-10-12 ENCOUNTER — ANESTHESIA EVENT (OUTPATIENT)
Dept: GASTROENTEROLOGY | Facility: HOSPITAL | Age: 78
End: 2023-10-12
Payer: MEDICARE

## 2023-10-12 ENCOUNTER — ANESTHESIA (OUTPATIENT)
Dept: GASTROENTEROLOGY | Facility: HOSPITAL | Age: 78
End: 2023-10-12
Payer: MEDICARE

## 2023-10-12 ENCOUNTER — HOSPITAL ENCOUNTER (OUTPATIENT)
Facility: HOSPITAL | Age: 78
Setting detail: HOSPITAL OUTPATIENT SURGERY
Discharge: HOME | End: 2023-10-12
Attending: SURGERY | Admitting: SURGERY
Payer: MEDICARE

## 2023-10-12 VITALS
DIASTOLIC BLOOD PRESSURE: 71 MMHG | BODY MASS INDEX: 24.8 KG/M2 | HEART RATE: 57 BPM | OXYGEN SATURATION: 97 % | SYSTOLIC BLOOD PRESSURE: 105 MMHG | HEIGHT: 63 IN | WEIGHT: 140 LBS | RESPIRATION RATE: 14 BRPM

## 2023-10-12 PROCEDURE — 25810000003 LACTATED RINGERS PER 1000 ML: Performed by: SURGERY

## 2023-10-12 PROCEDURE — G0105 COLORECTAL SCRN; HI RISK IND: HCPCS | Performed by: SURGERY

## 2023-10-12 PROCEDURE — 25010000002 PROPOFOL 10 MG/ML EMULSION: Performed by: NURSE ANESTHETIST, CERTIFIED REGISTERED

## 2023-10-12 PROCEDURE — S0260 H&P FOR SURGERY: HCPCS | Performed by: SURGERY

## 2023-10-12 RX ORDER — SODIUM CHLORIDE 0.9 % (FLUSH) 0.9 %
10 SYRINGE (ML) INJECTION AS NEEDED
Status: DISCONTINUED | OUTPATIENT
Start: 2023-10-12 | End: 2023-10-12 | Stop reason: HOSPADM

## 2023-10-12 RX ORDER — SODIUM CHLORIDE 0.9 % (FLUSH) 0.9 %
10 SYRINGE (ML) INJECTION EVERY 12 HOURS SCHEDULED
Status: DISCONTINUED | OUTPATIENT
Start: 2023-10-12 | End: 2023-10-12 | Stop reason: HOSPADM

## 2023-10-12 RX ORDER — PROPOFOL 10 MG/ML
VIAL (ML) INTRAVENOUS AS NEEDED
Status: DISCONTINUED | OUTPATIENT
Start: 2023-10-12 | End: 2023-10-12 | Stop reason: SURG

## 2023-10-12 RX ORDER — SODIUM CHLORIDE 9 MG/ML
40 INJECTION, SOLUTION INTRAVENOUS AS NEEDED
Status: DISCONTINUED | OUTPATIENT
Start: 2023-10-12 | End: 2023-10-12 | Stop reason: HOSPADM

## 2023-10-12 RX ORDER — SODIUM CHLORIDE, SODIUM LACTATE, POTASSIUM CHLORIDE, CALCIUM CHLORIDE 600; 310; 30; 20 MG/100ML; MG/100ML; MG/100ML; MG/100ML
30 INJECTION, SOLUTION INTRAVENOUS CONTINUOUS PRN
Status: DISCONTINUED | OUTPATIENT
Start: 2023-10-12 | End: 2023-10-12 | Stop reason: HOSPADM

## 2023-10-12 RX ORDER — LIDOCAINE HYDROCHLORIDE 20 MG/ML
INJECTION, SOLUTION INFILTRATION; PERINEURAL AS NEEDED
Status: DISCONTINUED | OUTPATIENT
Start: 2023-10-12 | End: 2023-10-12 | Stop reason: SURG

## 2023-10-12 RX ORDER — EPHEDRINE SULFATE 50 MG/ML
INJECTION, SOLUTION INTRAVENOUS AS NEEDED
Status: DISCONTINUED | OUTPATIENT
Start: 2023-10-12 | End: 2023-10-12 | Stop reason: SURG

## 2023-10-12 RX ADMIN — PROPOFOL 60 MG: 10 INJECTION, EMULSION INTRAVENOUS at 08:35

## 2023-10-12 RX ADMIN — SODIUM CHLORIDE, POTASSIUM CHLORIDE, SODIUM LACTATE AND CALCIUM CHLORIDE 30 ML/HR: 600; 310; 30; 20 INJECTION, SOLUTION INTRAVENOUS at 08:24

## 2023-10-12 RX ADMIN — PROPOFOL 140 MCG/KG/MIN: 10 INJECTION, EMULSION INTRAVENOUS at 08:35

## 2023-10-12 RX ADMIN — LIDOCAINE HYDROCHLORIDE 60 MG: 20 INJECTION, SOLUTION INFILTRATION; PERINEURAL at 08:35

## 2023-10-12 RX ADMIN — EPHEDRINE SULFATE 10 MG: 50 INJECTION INTRAVENOUS at 08:57

## 2023-10-12 NOTE — OP NOTE
Operative Note :   MD Max Roach  1945    Procedure Date: 10/12/23    Pre-op Diagnosis:  Family history of colon cancer    Post-op Diagnosis:  Diverticulosis    Procedure:   Colonoscopy to terminal ileum    Surgeon: Maximo Waller MD      Anesthesia: MAC    Indications:  77-year-old female presenting for colonoscopy family history in her father of colon cancer.  She believes last scope was done around 2012 and was unremarkable at that time.  No other known high risk factors or symptoms.    Findings:   Extensive diverticular changes throughout the colon    Recommendations:   No repeat screening endoscopy recommended    Description of procedure:    After obtaining informed consent, patient was brought to the endoscopy suite and was sedated.  Digital rectal exam was undertaken and was unremarkable.  The flexible colonoscope was then introduced through the rectum and passed around to the level of the cecum under direct visualization.  The ileocecal valve and appendiceal orifice appeared normal.  The terminal ileum was intubated and was unremarkable.  The scope was pulled back into the cecum which was unremarkable.  The ascending colon had significant diverticular changes.  The hepatic flexure and transverse colon were unremarkable.  The splenic flexure and descending colon were relatively normal.  There were extensive diverticular changes within the sigmoid colon.  The rectum was normal.  There were no mass lesions.  The patient tolerated this well.    Maximo Waller MD  General and Endoscopic Surgery  [facility]    [address], [address]  [place], [place], [address]  P: [phone]  F: [phone]

## 2023-10-12 NOTE — ANESTHESIA POSTPROCEDURE EVALUATION
Patient: Max Ortiz    Procedure Summary       Date: 10/12/23 Room / Location: [facility]COPY 5 /  [facility]    Anesthesia Start: 0831 Anesthesia Stop: 0903    Procedure: COLONOSCOPY to cecum Diagnosis:       Screening for malignant neoplasm of colon      (Screening for malignant neoplasm of colon [Z12.11])    Surgeons: Maximo Waller MD Provider: Brandon Leyva MD    Anesthesia Type: MAC ASA Status: 2            Anesthesia Type: MAC    Vitals  Vitals Value Taken Time   /71 10/12/23 0930   Temp     Pulse 57 10/12/23 0930   Resp 14 10/12/23 0930   SpO2 97 % 10/12/23 0930           Post Anesthesia Care and Evaluation    Patient location during evaluation: PACU  Patient participation: complete - patient participated  Level of consciousness: awake and alert  Pain management: adequate    Airway patency: patent  Anesthetic complications: No anesthetic complications    Cardiovascular status: acceptable  Respiratory status: acceptable  Hydration status: acceptable    Comments: --------------------            10/12/23               0930     --------------------   BP:       105/71     Pulse:      57       Resp:       14       SpO2:      97%      --------------------

## 2023-10-12 NOTE — H&P
Cc: Family history of colon cancer    History of presenting illness: 77-year-old female presenting for colonoscopy.  Last scope done 11 years ago and she reports it was normal at that time.  History of colon cancer in her father.  She has had a chronic mild normocytic anemia which is unchanged over the last several years.    Past medical history: Hypertension, osteoporosis, arthritis    Past surgical history: Excision of breast cyst, hysterectomy, colonoscopy last done around 2012, she believes it was normal    Medications: Amlodipine, L. Cintia, Cosopt, hydralazine, lisinopril, Ativan    Allergies: Aspirin and penicillin, both of which sound more like reactions than true allergies    Social history: She is a non-smoker    Family history: Positive for colon cancer in her father    Physical exam:  Body mass index: 24.8  General: Awake and alert, no distress  Head: Normocephalic, atraumatic  Neck: Supple, trachea midline  Eyes: Extraocular movements intact, no icterus  Respiratory: No use of accessory muscles, good bilateral chest expansion  Abdomen: Soft, benign, no hernia felt  Skin: Warm and dry    Most recent hemoglobin 10.9 which is essentially in the same region to where it has been for the last several years.  MCV has been in the normal range.    Assessment and plan:  -Family history of colon cancer  -Plan for colonoscopy today, risks include bleeding and perforation, patient agreeable to proceed    Maximo Waller MD  General and Endoscopic Surgery  [facility]    [address], [address]  [place], [place], [address]  P: [phone]  F: [phone]

## 2023-10-12 NOTE — ANESTHESIA PREPROCEDURE EVALUATION
Anesthesia Evaluation     Patient summary reviewed and Nursing notes reviewed   NPO Solid Status: > 8 hours             Airway   Mallampati: II  TM distance: >3 FB  Neck ROM: full  no difficulty expected  Dental - normal exam     Pulmonary - normal exam   Cardiovascular - normal exam    (+) hypertension      Neuro/Psych  GI/Hepatic/Renal/Endo      Musculoskeletal     Abdominal  - normal exam   Substance History      OB/GYN          Other   arthritis,                 Anesthesia Plan    ASA 2     MAC     intravenous induction     Anesthetic plan, risks, benefits, and alternatives have been provided, discussed and informed consent has been obtained with: patient.    CODE STATUS:

## 2023-10-30 ENCOUNTER — OFFICE VISIT (OUTPATIENT)
Dept: FAMILY MEDICINE CLINIC | Facility: CLINIC | Age: 78
End: 2023-10-30
Payer: MEDICARE

## 2023-10-30 VITALS
WEIGHT: 144 LBS | SYSTOLIC BLOOD PRESSURE: 130 MMHG | HEIGHT: 63 IN | BODY MASS INDEX: 25.52 KG/M2 | DIASTOLIC BLOOD PRESSURE: 88 MMHG

## 2023-10-30 DIAGNOSIS — M79.641 PAIN OF RIGHT HAND: Primary | ICD-10-CM

## 2023-10-30 DIAGNOSIS — I10 ESSENTIAL HYPERTENSION: ICD-10-CM
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
## 2023-10-30 RX ORDER — LISINOPRIL 40 MG/1
40 TABLET ORAL DAILY
Qty: 90 TABLET | Refills: 3 | Status: SHIPPED | OUTPATIENT
Start: 2023-10-30

## 2023-11-01 ENCOUNTER — HOSPITAL ENCOUNTER (OUTPATIENT)
Dept: GENERAL RADIOLOGY | Facility: HOSPITAL | Age: 78
Discharge: HOME | End: 2023-11-01
Payer: MEDICARE

## 2023-11-01 ENCOUNTER — HOSPITAL ENCOUNTER (OUTPATIENT)
Dept: GENERAL RADIOLOGY | Facility: HOSPITAL | Age: 78
Discharge: HOME | End: 2023-11-01
Admitting: INTERNAL MEDICINE
Payer: MEDICARE

## 2023-11-01 DIAGNOSIS — M79.641 PAIN OF RIGHT HAND: ICD-10-CM

## 2023-11-01 PROCEDURE — 73590 X-RAY EXAM OF LOWER LEG: CPT

## 2023-11-01 PROCEDURE — 73130 X-RAY EXAM OF HAND: CPT

## 2023-11-07 NOTE — PROGRESS NOTES
10/30/2023    Assessment & Plan   This right-handed 78-year-old presents at this time for complaint of pain in her right hand for the past 20 years off and on.  She denies history of trauma such as motor vehicle accidents etc.  She characterized the pain as low-grade aching in nature along the right hand and wrist.  The pain is relieved with NSAIDs and has no initiating actions that the patient can recall.  She relates that she has had pain in both knees off and on for the past several months.  And she admits to a fall about a week ago while getting out of bed onto her right leg.  She relates that later she noticed not on the right inner aspect of her leg and this seems to be resolving but is still mildly tender to put touch.  She is concerned about developing arthritis.    Examination of the hands show no evidence of any tenderness at the DIP or PIP of her hands.  The wrist is mildly tender but is not warm.  Range of motion appears intact.  Left wrist is demonstrative of no increased warmth or tenderness.  Patient complains that her right hand and wrist tend to hurt more in the morning.      Patient's blood pressure shows good control at 130/88 in the left arm sitting position standard cuff.  We will continue her on amlodipine 10 mg p.o. every morning and hydralazine 25 mg 1 tablet twice daily plus lisinopril 40 mg 1 tab p.o. every morning.    Diagnoses and all orders for this visit:    1. Pain of right hand (Primary)  -     Rheumatoid Factor; Future  -     Cancel: Sedimentation Rate; Future  -     Cancel: PATRICIA; Future  -     Cancel: Cyclic Citrul Peptide Antibody, IgG / IgA; Future  -     XR Hand 3+ View Right; Future  -     XR Tibia Fibula 2 View Right (In Office); Future  -     Ambulatory Referral to Rheumatology  -     Cancel: PATRICIA  -     Cyclic Citrul Peptide Antibody, IgG / IgA  -     Sedimentation Rate  -     PATRICIA    2. Essential hypertension  -     lisinopril (PRINIVIL,ZESTRIL) 40 MG tablet; Take 1 tablet by  mouth Daily.  Dispense: 90 tablet; Refill: 3    3. Fall (on) (from) other stairs and steps, initial encounter    Plan:  1.)  X-ray of the right tibial area  2.)  X-ray of the right hand and wrist  3.)  Voltaren gel to be applied to the wrist and hand twice daily  4.)  PATRICIA, rheumatoid factor,         CC: Hypertension (F/U) and Cyst (On lower right leg.  Fell off her bed last month---no other issues)  .        HPI  Hypertension  Pertinent negatives include no chest pain, palpitations or shortness of breath.   Cyst  Pertinent negatives include no chest pain, chills, coughing, fever, nausea or vomiting.      Subjective   Max Ortiz is a 78 y.o. female.      The following portions of the patient's history were reviewed and updated as appropriate: allergies, current medications, past family history, past medical history, past social history, past surgical history, and problem list.    Problem List  Patient Active Problem List   Diagnosis    Screening for malignant neoplasm of colon       Past Medical History  Past Medical History:   Diagnosis Date    Allergic     SEASONAL    Arthritis     Cataract     Hypertension     Osteoporosis     Urgency of urination        Surgical History  Past Surgical History:   Procedure Laterality Date    BREAST CYST EXCISION      CATARACT EXTRACTION, BILATERAL Bilateral     COLONOSCOPY      COLONOSCOPY N/A 10/12/2023    Procedure: COLONOSCOPY to cecum;  Surgeon: Maximo Waller MD;  Location: [facility] END[facility];  Service: General;  Laterality: N/A;  pre- fam hx colon CA  post- diverticulosis    EYE SURGERY      HYSTERECTOMY         Family History  Family History   Problem Relation Age of Onset    Alzheimer's disease Mother     Hypertension Mother     Heart disease Mother     Hypertension Father     Diabetes Father     Colon cancer Father     Colon cancer Paternal Aunt     Diabetes Paternal Uncle     Breast cancer Cousin     Venessa Hyperthermia Neg Hx        Social History  Social  History    Tobacco Use      Smoking status: Never      Smokeless tobacco: Never       Is the Patient a current tobacco user? No    Allergies  Allergies   Allergen Reactions    Aspirin Other (See Comments)     UPSETS STOMACH    Penicillins Other (See Comments)     RED AND SWOLLEN AT INJECTION SITE       Current Medications    Current Outpatient Medications:     amLODIPine (NORVASC) 10 MG tablet, Take 1 tablet by mouth once daily, Disp: 90 tablet, Rfl: 0    brimonidine (ALPHAGAN P) 0.1 % solution ophthalmic solution, Every 8 (Eight) Hours., Disp: , Rfl:     dorzolamide-timolol (COSOPT) 22.3-6.8 MG/ML ophthalmic solution, INSTILL 1 DROP INTO EACH EYE TWICE DAILY, Disp: , Rfl:     hydrALAZINE (APRESOLINE) 25 MG tablet, Take 1 tablet by mouth 2 (Two) Times a Day., Disp: 180 tablet, Rfl: 1    lisinopril (PRINIVIL,ZESTRIL) 40 MG tablet, Take 1 tablet by mouth Daily., Disp: 90 tablet, Rfl: 3    LORazepam (ATIVAN) 0.5 MG tablet, Take 1 tablet by mouth At Night As Needed (1 tablet at bed as needed). for anxiety, Disp: 30 tablet, Rfl: 0    Rhopressa 0.02 % solution, INSTILL 1 DROP INTO EACH EYE EVERY DAY AT BEDTIME, Disp: , Rfl:     triamcinolone (KENALOG) 0.1 % cream, Apply 1 application topically to the appropriate area as directed 2 (Two) Times a Day., Disp: 30 g, Rfl: 1     Review of System  Review of Systems   Constitutional:  Negative for chills and fever.   Respiratory:  Negative for cough and shortness of breath.    Cardiovascular:  Negative for chest pain and palpitations.   Gastrointestinal:  Negative for constipation, diarrhea, nausea and vomiting.   Neurological:  Negative for dizziness and headache.   I have reviewed and confirmed the accuracy of the ROS as documented by the MA/LPN/RN Chintan Townsend MD    Vitals:    10/30/23 1508   BP: 130/88     Body mass index is 25.51 kg/m².    Objective     Physical Exam  Physical Exam  Constitutional:       General: She is not in acute distress.     Appearance: Normal  appearance.   HENT:      Head: Normocephalic and atraumatic.   Cardiovascular:      Rate and Rhythm: Normal rate and regular rhythm.   Pulmonary:      Effort: Pulmonary effort is normal. No respiratory distress.      Breath sounds: Normal breath sounds. No wheezing, rhonchi or rales.   Neurological:      General: No focal deficit present.      Mental Status: She is alert and oriented to person, place, and time.   Psychiatric:         Mood and Affect: Mood normal.         Behavior: Behavior normal.         Thought Content: Thought content normal.         Judgment: Judgment normal.         Chintan Townsend MD  10/30/2023

## 2023-11-07 NOTE — PROGRESS NOTES
Relayed to the patient that her x-ray of the hand shows no evidence of a fracture but mild arthritis is seen.

## 2023-11-18 DIAGNOSIS — I10 ESSENTIAL HYPERTENSION: ICD-10-CM

## 2023-11-20 RX ORDER — AMLODIPINE BESYLATE 10 MG/1
TABLET ORAL
Qty: 90 TABLET | Refills: 0 | Status: SHIPPED | OUTPATIENT
Start: 2023-11-20

## 2023-12-06 LAB
ANA SER QL: NEGATIVE
RHEUMATOID FACT SERPL-ACNC: 10.7 IU/ML

## 2024-02-01 LAB
CCP IGA+IGG SERPL IA-ACNC: 7 UNITS (ref 0–19)
ERYTHROCYTE [SEDIMENTATION RATE] IN BLOOD BY WESTERGREN METHOD: 38 MM/HR (ref 0–30)

## 2024-02-06 DIAGNOSIS — I10 ESSENTIAL HYPERTENSION: ICD-10-CM

## 2024-02-06 DIAGNOSIS — I10 ESSENTIAL HYPERTENSION, BENIGN: ICD-10-CM

## 2024-02-06 RX ORDER — HYDRALAZINE HYDROCHLORIDE 25 MG/1
25 TABLET, FILM COATED ORAL 2 TIMES DAILY
Qty: 180 TABLET | Refills: 0 | Status: SHIPPED | OUTPATIENT
Start: 2024-02-06

## 2024-02-06 RX ORDER — AMLODIPINE BESYLATE 10 MG/1
TABLET ORAL
Qty: 90 TABLET | Refills: 0 | Status: SHIPPED | OUTPATIENT
Start: 2024-02-06

## 2024-04-05 ENCOUNTER — PROCEDURE VISIT (OUTPATIENT)
Dept: OBSTETRICS AND GYNECOLOGY | Facility: CLINIC | Age: 79
End: 2024-04-05
Payer: MEDICARE

## 2024-04-05 ENCOUNTER — OFFICE VISIT (OUTPATIENT)
Dept: OBSTETRICS AND GYNECOLOGY | Facility: CLINIC | Age: 79
End: 2024-04-05
Payer: MEDICARE

## 2024-04-05 VITALS
DIASTOLIC BLOOD PRESSURE: 78 MMHG | SYSTOLIC BLOOD PRESSURE: 140 MMHG | WEIGHT: 142 LBS | HEIGHT: 63 IN | BODY MASS INDEX: 25.16 KG/M2

## 2024-04-05 DIAGNOSIS — Z12.31 VISIT FOR SCREENING MAMMOGRAM: Primary | ICD-10-CM

## 2024-04-05 DIAGNOSIS — R32 URINARY INCONTINENCE, UNSPECIFIED TYPE: Primary | ICD-10-CM

## 2024-04-05 DIAGNOSIS — Z00.00 ANNUAL PHYSICAL EXAM: ICD-10-CM

## 2024-04-05 NOTE — PROGRESS NOTES
HPI   Max Ortiz  is a 78 y.o. female who presents for a routine gynecologic exam.  Overall, she is feeling well.  She does continue to have occasional urinary incontinence.  This has worsened slightly since the last visit.  Mammogram was performed today.  Colonoscopy was performed in 2023.    Chief Complaint   Patient presents with    Annual Exam     Here today for AE        Past Medical History:   Diagnosis Date    Allergic     SEASONAL    Arthritis     Cataract     Hypertension     Osteoporosis     Urgency of urination        Past Surgical History:   Procedure Laterality Date    BREAST CYST EXCISION      CATARACT EXTRACTION, BILATERAL Bilateral     COLONOSCOPY      COLONOSCOPY N/A 10/12/2023    Procedure: COLONOSCOPY to cecum;  Surgeon: Maximo Waller MD;  Location: [facility] ENDOSCOPY;  Service: General;  Laterality: N/A;  pre- fam hx colon CA  post- diverticulosis    EYE SURGERY      HYSTERECTOMY         Social History     Socioeconomic History    Marital status:    Tobacco Use    Smoking status: Never    Smokeless tobacco: Never   Substance and Sexual Activity    Alcohol use: Yes     Comment: rarely    Drug use: No    Sexual activity: Not Currently     Partners: Male       The following portions of the patient's history were reviewed and updated as appropriate: allergies, current medications, past family history, past medical history, past social history, past surgical history and problem list.    Review of Systems  This is positive for intermittent urinary incontinence.  Negative for fever or chills.  Negative for nausea or vomiting.  Negative for unexplained weight change.  All other systems are reviewed and are negative.        Physical Exam  Vitals and nursing note reviewed.   Constitutional:       Appearance: She is well-developed.   HENT:      Head: Normocephalic and atraumatic.   Cardiovascular:      Rate and Rhythm: Normal rate and regular rhythm.   Pulmonary:      Effort: Pulmonary  effort is normal.      Breath sounds: Normal breath sounds. No wheezing or rales.   Chest:      Comments: The breasts are homogeneous.  There are no palpable lumps.  Nipple discharge and axillary adenopathy are absent.  Abdominal:      General: There is no distension.      Palpations: Abdomen is soft.      Tenderness: There is no abdominal tenderness.   Genitourinary:     Labia:         Right: No lesion.         Left: No lesion.       Comments: Urethral meatus is normal  The urethra is normal  [place]'s and [place]'s glands are normal  The vagina is atrophic, but without lesion.  Support for the vagina is adequate  Cervix and uterus are surgically absent  No adnexal masses are palpable  Skin:     General: Skin is warm and dry.   Neurological:      Mental Status: She is alert and oriented to person, place, and time.         Assessment    Diagnoses and all orders for this visit:    1. Urinary incontinence, unspecified type (Primary)  -     Ambulatory Referral to Physical Therapy Pelvic Floor    2. Annual physical exam        Plan  Annual examination performed  Counseled regarding the importance of regular screening mammograms.  Mammogram was performed today.  The patient is current with colon cancer screening  Urinary incontinence.  The patient had an episode that was concerning to her earlier this year.  We discussed options for management.  I recommend pelvic floor muscle physical therapy.  We discussed this and the patient agrees.  A referral has been sent.    Return in about 2 years (around 4/5/2026).    Social History     Tobacco Use   Smoking Status Never   Smokeless Tobacco Never

## 2024-04-24 ENCOUNTER — HOSPITAL ENCOUNTER (OUTPATIENT)
Dept: PHYSICAL THERAPY | Facility: HOSPITAL | Age: 79
Setting detail: THERAPIES SERIES
Discharge: HOME | End: 2024-04-24
Payer: MEDICARE

## 2024-04-24 DIAGNOSIS — N39.41 URGE INCONTINENCE OF URINE: Primary | ICD-10-CM

## 2024-04-24 DIAGNOSIS — R35.1 NOCTURIA: ICD-10-CM

## 2024-04-24 PROCEDURE — 97530 THERAPEUTIC ACTIVITIES: CPT

## 2024-04-24 PROCEDURE — 97162 PT EVAL MOD COMPLEX 30 MIN: CPT

## 2024-04-24 NOTE — THERAPY EVALUATION
Outpatient Physical Therapy Pelvic Health Initial Evaluation  [facility]     Patient Name: Max Bonds  : 1945  MRN: [id number]  Today's Date: 2024        Visit Date: 2024    Patient Active Problem List   Diagnosis    Screening for malignant neoplasm of colon        Past Medical History:   Diagnosis Date    Allergic     SEASONAL    Arthritis     Cataract     Hypertension     Osteoporosis     Urgency of urination         Past Surgical History:   Procedure Laterality Date    BREAST CYST EXCISION      CATARACT EXTRACTION, BILATERAL Bilateral     COLONOSCOPY      COLONOSCOPY N/A 10/12/2023    Procedure: COLONOSCOPY to cecum;  Surgeon: Maximo Waller MD;  Location: [facility] ENDOSCOPY;  Service: General;  Laterality: N/A;  pre- fam hx colon CA  post- diverticulosis    EYE SURGERY      HYSTERECTOMY           Visit Dx:    ICD-10-CM ICD-9-CM   1. Urge incontinence of urine  N39.41 788.31   2. Nocturia  R35.1 788.43        Patient History       Row Name 24 1400             Daily Activities    Primary Language English  -CC      Barriers to learning None  -CC      Pt Participated in POC and Goals Yes  -CC                User Key  (r) = Recorded By, (t) = Taken By, (c) = Cosigned By      Initials Name Provider Type    CC Marisa Pino, PT Physical Therapist                     Pelvic Health       Row Name 24 1400             Subjective    Patient Reason for Visit Urge Incontinence;Other  -CC      Brief Description of Chief Complaint Max Bonds is a 78 y.o. female who presents today with urinary incontinence. Pt reports going pee 2-3x per night. During day every 2-3 hours. Reports if she doesn't go when she first feels the urge, will have leakage, at night sometimes full loss of bladder.PMH includes high BP, .  -CC      Patient Participated in POC and Goals Yes  -CC         Daily Activities    Primary Language English  -CC      Barriers to learning None  -CC       Pt Participated in POC and Goals Yes  -CC         Urinary/Bowel History    Difficulty starting stream no  -CC      Hesitancy yes  -CC      Stress incontinence sometimes with sneezing/coughing  -CC      Urgency yes - mainly at night, or sometimes in day as well especially if delaying urination  -CC      Urgency episodes per day usually happens at night  -CC      Leaks secondary to urge (times per day) mainly at night: 1-2 times per night, has only occured once during the day  -CC      Nocturia (times per night) 2-3  -CC      Daytime frequency (hours) 2-3  -CC      Fluid Intake Coffee (2c), sour cherry juice, cup of tea (not every day), sometimes 7 oz soda (not every day), water - about 7-8c of water per day  -CC         Pregnancy/Sexual History    Number of Pregnancies 4  -CC      Number of Children 2  -CC      Type of Previous Deliveries Vaginal  -CC      Sexual health No  -CC         Pelvic Floor Muscle    Patient/Parent/Guardian Consented to Internal Pelvic Floor Exam Yes  -CC      Strength (Right) 4: Strong contraction  -CC      Strength (Left) 4: Strong contraction  -CC      Symmetry of Sustained Maximal Contraction Symmetrical  -CC      Endurance (Ability to Hold Maximal Contraction) 6 sec  -CC      # of Reps of Maximal Contractions while Maintaining Endurance and Strength 0 unable to hold 10 seconds  -CC      Fast Contraction (# of 1 sec contractions performed) 6  significant cues for relaxation each rep, >1 sec contractions  -CC      3rd Layer Tone/Internal Palpation bobby LA TTP (2-3/10); B OI (4/10) TTP; Noted taut muscle band R OI, L LA  -CC         Observations    Perineal Observation Performed? Yes  -CC         Observation of Contraction in Perineum    Perineal Body Lift Present  -CC         Pelvic Floor    Ability to Isolate Contraction of Pelvic Floor Yes  -CC         Cough    Abdominal Contraction Yes  -CC      Leak None  -CC      Contraction of Pelvic Floor No  -CC      Bulge No  -CC      No  Response Yes  -CC      Other Cough Able to perform knack with cueing  -CC         Abdominal Assessment    Breathing Pattern paradoxical, significant difficulty with PFM coordination and belly breathing  -CC         Education Provided On:    Education Points Behavioral modifications;HEP;Information on dietary irritants to bladder/bowels  -CC      Method of Delivery Verbal;Demonstration;Written  -CC      Education Provided To Patient  -CC      Level of Understanding Teach back education performed;Verbalized;Demonstrated  -CC      HEP Comments Access Code [email or web address]  -CC      Education Comments Brief discussion bladder irritant and limiting fluids 2 hours before bed. Education on PFM anatomy, goals of PT, POC  -CC                User Key  (r) = Recorded By, (t) = Taken By, (c) = Cosigned By      Initials Name Provider Type    CC Marisa Pino, PT Physical Therapist                                  PT Assessment/Plan       Row Name 04/24/24 1400          PT Assessment    Functional Limitations Limitation in home management;Limitations in community activities;Limitations in functional capacity and performance;Performance in leisure activities;Performance in self-care ADL  -CC     Impairments Muscle strength;Impaired muscle length;Impaired muscle power;Impaired muscle endurance;Coordination;Impaired flexibility  -CC     Assessment Comments Max Bonds is a 78 y.o. female referred to physical therapy for urinary incontinence. She presents with an evolving clinical presentation, along with no remarkable comorbidities or personal factors that may impact her progress in the plan of care. Pt presents today with PFM weakness/decr endurance/decr coordination . Her signs and symptoms are consistent with referring diagnosis. The previous impairments limit her ability to sleep without need to wake to urinate or experience UI, sneeze/cough without loss of urine, and experience urinary hesistancy when voiding. Pt will  benefit from skilled PT to address the previous impairments and return to PLOF.  -CC     Please refer to paper survey for additional self-reported information Yes  -CC     Rehab Potential Good  -CC     Patient/caregiver participated in establishment of treatment plan and goals Yes  -CC     Patient would benefit from skilled therapy intervention Yes  -CC        PT Plan    PT Frequency 1x/week  -CC     Predicted Duration of Therapy Intervention (PT) 6-10 visits  -CC     Planned CPT's? PT EVAL MOD COMPLELITY: 07086;PT RE-EVAL: 64350;PT THER PROC EA 15 MIN: 67195;PT THER ACT EA 15 MIN: 55906;PT MANUAL THERAPY EA 15 MIN: 59948;PT NEUROMUSC RE-EDUCATION EA 15 MIN: 47393;PT GAIT TRAINING EA 15 MIN: 23032;PT SELF CARE/HOME MGMT/TRAIN EA 15: 65065;PT HOT OR COLD PACK TREAT MCARE  -CC     PT Plan Comments Next visit: response to HEP, repeat manual if beneficial, modified happy baby, butterfly str, avoid JIC peeing,  -CC               User Key  (r) = Recorded By, (t) = Taken By, (c) = Cosigned By      Initials Name Provider Type    CC Marisa Pino, PT Physical Therapist                       OP Exercises       Row Name 24 1400             Subjective    Patient Reason for Visit Urge Incontinence;Other  -CC      Brief Description of Chief Complaint Max Bonds is a 78 y.o. female who presents today with urinary incontinence. Pt reports going pee 2-3x per night. During day every 2-3 hours. Reports if she doesn't go when she first feels the urge, will have leakage, at night sometimes full loss of bladder.PMH includes high BP, .  -CC      Patient Participated in POC and Goals Yes  -CC         Total Minutes    46389 - PT Therapeutic Activity Minutes 15  -CC         Exercise 1    Exercise Name 1 diaphragmatic breathing with PF length and contract  -CC      Cueing 1 Verbal;Tactile  -CC      Sets 1 --  -CC      Reps 1 15  -CC      Time 1 supine  -CC      Additional Comments cueing internally for  "coordination  -CC         Exercise 2    Exercise Name 2 hip ER str EOB  -CC      Cueing 2 Demo;Verbal  -CC      Reps 2 1 bobby  -CC      Time 2 30 sec  -CC         Exercise 3    Exercise Name 3 discussion of limiting fluids 2 hours before bed, including sour cherry juice and sodas  -CC      Cueing 3 Verbal  -CC                User Key  (r) = Recorded By, (t) = Taken By, (c) = Cosigned By      Initials Name Provider Type    Marisa Shine, PT Physical Therapist                                 PT OP Goals       Row Name 04/24/24 1500          PT Short Term Goals    STG Date to Achieve 05/24/24  -CC     STG 1 Patient will be independent in the performance of a home program of PFM exercises on a daily basis to increase muscle strength and recruitment as measured with digital palpation in order to increase continence control.  -CC     STG 1 Progress New  -     STG 2 Patient will report at least 50% decrease in urinary incontinence episodes per voiding log in order to improve participation in work and social activities.  -CC     STG 2 Progress New  -     STG 3 Patient will demonstrate use of functional PFM contraction by performing a \"pelvic brace\" or \"knack\" in order to reduce or eliminate UI during a cough.  -CC     STG 3 Progress New  -        Long Term Goals    LTG Date to Achieve 06/23/24  -CC     LTG 1 Pt will be independent with comprehensive HEP to address core/hip/PFM strength and coordination for self-management of condition  -CC     LTG 1 Progress New  -     LTG 2 Pt will report </=1-2 night wakings to urinate.  -CC     LTG 2 Progress New  -     LTG 3 Patient will demonstrate an increase in PFM endurance  to consistent 10 second hold x 10 repetitions.  -     LTG 3 Progress New  -        Time Calculation    PT Goal Re-Cert Due Date 07/23/24  -CC               User Key  (r) = Recorded By, (t) = Taken By, (c) = Cosigned By      Initials Name Provider Type    Marisa Sihne, MARA " Physical Therapist                                    Time Calculation:   Start Time: 1400  Stop Time: 1447  Time Calculation (min): 47 min  Total Timed Code Minutes- PT: 15 minute(s)  Timed Charges  43284 - PT Therapeutic Activity Minutes: 15  Untimed Charges  PT Eval/Re-eval Minutes: 32  Total Minutes  Timed Charges Total Minutes: 15  Untimed Charges Total Minutes: 32   Total Minutes: 47  Therapy Charges for Today       Code Description Service Date Service Provider Modifiers Qty    74648284474 HC PT THERAPEUTIC ACT EA 15 MIN 4/24/2024 Marisa Pino, PT GP 1    84853882353 HC PT EVAL MOD COMPLEXITY 2 4/24/2024 Marisa Pino, PT GP 1                    Marisa Pino PT  4/24/2024

## 2024-05-02 DIAGNOSIS — I10 ESSENTIAL HYPERTENSION, BENIGN: ICD-10-CM

## 2024-05-02 RX ORDER — HYDRALAZINE HYDROCHLORIDE 25 MG/1
25 TABLET, FILM COATED ORAL 2 TIMES DAILY
Qty: 180 TABLET | Refills: 0 | Status: SHIPPED | OUTPATIENT
Start: 2024-05-02

## 2024-05-13 ENCOUNTER — OFFICE VISIT (OUTPATIENT)
Dept: FAMILY MEDICINE CLINIC | Facility: CLINIC | Age: 79
End: 2024-05-13
Payer: MEDICARE

## 2024-05-13 VITALS
WEIGHT: 148 LBS | BODY MASS INDEX: 27.23 KG/M2 | DIASTOLIC BLOOD PRESSURE: 90 MMHG | SYSTOLIC BLOOD PRESSURE: 130 MMHG | HEIGHT: 62 IN

## 2024-05-13 DIAGNOSIS — I10 PRIMARY HYPERTENSION: Primary | ICD-10-CM

## 2024-05-13 DIAGNOSIS — Z23 NEED FOR COVID-19 VACCINE: ICD-10-CM

## 2024-05-13 DIAGNOSIS — E78.5 HYPERLIPIDEMIA, UNSPECIFIED HYPERLIPIDEMIA TYPE: ICD-10-CM

## 2024-05-13 DIAGNOSIS — D50.9 IRON DEFICIENCY ANEMIA, UNSPECIFIED IRON DEFICIENCY ANEMIA TYPE: ICD-10-CM

## 2024-05-13 DIAGNOSIS — Z23 NEED FOR PNEUMOCOCCAL VACCINE: ICD-10-CM

## 2024-05-13 PROCEDURE — 1125F AMNT PAIN NOTED PAIN PRSNT: CPT | Performed by: INTERNAL MEDICINE

## 2024-05-13 PROCEDURE — 90677 PCV20 VACCINE IM: CPT | Performed by: INTERNAL MEDICINE

## 2024-05-13 PROCEDURE — 1159F MED LIST DOCD IN RCRD: CPT | Performed by: INTERNAL MEDICINE

## 2024-05-13 PROCEDURE — G0009 ADMIN PNEUMOCOCCAL VACCINE: HCPCS | Performed by: INTERNAL MEDICINE

## 2024-05-13 PROCEDURE — G0439 PPPS, SUBSEQ VISIT: HCPCS | Performed by: INTERNAL MEDICINE

## 2024-05-13 PROCEDURE — 1160F RVW MEDS BY RX/DR IN RCRD: CPT | Performed by: INTERNAL MEDICINE

## 2024-05-13 NOTE — PROGRESS NOTES
The ABCs of the Annual Wellness Visit  Subsequent Medicare Wellness Visit    Chief Complaint   Patient presents with    Medicare Wellness-subsequent     No other issues      Subjective    History of Present Illness:  Max Bonds is a 78 y.o. female who presents for a Subsequent Medicare Wellness Visit.    The following portions of the patient's history were reviewed and   updated as appropriate: allergies, current medications, past family history, past medical history, past social history, past surgical history, and problem list.    Compared to one year ago, the patient feels her physical   health is the same.    Compared to one year ago, the patient feels her mental   health is worse.    Recent Hospitalizations:  She was not admitted to the hospital during the last year.       Current Medical Providers:  Patient Care Team:  Chintan Townsend MD as PCP - General (Internal Medicine)    Outpatient Medications Prior to Visit   Medication Sig Dispense Refill    amLODIPine (NORVASC) 10 MG tablet Take 1 tablet by mouth once daily 90 tablet 0    brimonidine (ALPHAGAN P) 0.1 % solution ophthalmic solution Every 8 (Eight) Hours.      dorzolamide-timolol (COSOPT) 22.3-6.8 MG/ML ophthalmic solution INSTILL 1 DROP INTO EACH EYE TWICE DAILY      hydrALAZINE (APRESOLINE) 25 MG tablet Take 1 tablet by mouth twice daily 180 tablet 0    lisinopril (PRINIVIL,ZESTRIL) 40 MG tablet Take 1 tablet by mouth Daily. 90 tablet 3    LORazepam (ATIVAN) 0.5 MG tablet Take 1 tablet by mouth At Night As Needed (1 tablet at bed as needed). for anxiety 30 tablet 0    Rhopressa 0.02 % solution INSTILL 1 DROP INTO EACH EYE EVERY DAY AT BEDTIME      triamcinolone (KENALOG) 0.1 % cream Apply 1 application topically to the appropriate area as directed 2 (Two) Times a Day. 30 g 1     No facility-administered medications prior to visit.       No opioid medication identified on active medication list. I have reviewed chart for other potential   "high risk medication/s and harmful drug interactions in the elderly.        Aspirin is not on active medication list.  Aspirin use is not indicated based on review of current medical condition/s. Risk of harm outweighs potential benefits.  .    Patient Active Problem List   Diagnosis    Screening for malignant neoplasm of colon     Advance Care Planning  Advance Directive is not on file.  ACP discussion was held with the patient during this visit. Patient does not have an advance directive, information provided.    Review of Systems   Constitutional: Negative.    HENT: Negative.     Eyes: Negative.    Respiratory: Negative.     Cardiovascular: Negative.    Gastrointestinal: Negative.    Musculoskeletal: Negative.    Skin: Negative.    Psychiatric/Behavioral: Negative.           Objective    Vitals:    05/13/24 1329   BP: 130/90   Weight: 67.1 kg (148 lb)   Height: 157.5 cm (62\")     BMI Readings from Last 1 Encounters:   05/13/24 27.07 kg/m²   BMI is above normal parameters. Recommendations include: continue with current weight loss program    Does the patient have evidence of cognitive impairment? No    Physical Exam  Vitals and nursing note reviewed.   Constitutional:       Appearance: She is well-developed.   HENT:      Head: Normocephalic and atraumatic.   Eyes:      Conjunctiva/sclera: Conjunctivae normal.      Pupils: Pupils are equal, round, and reactive to light.   Cardiovascular:      Rate and Rhythm: Normal rate and regular rhythm.      Heart sounds: Normal heart sounds.   Pulmonary:      Effort: Pulmonary effort is normal.      Breath sounds: Normal breath sounds.   Abdominal:      General: Bowel sounds are normal.      Palpations: Abdomen is soft.   Musculoskeletal:         General: Normal range of motion.      Cervical back: Normal range of motion and neck supple.   Skin:     General: Skin is warm.   Neurological:      Mental Status: She is alert.   Psychiatric:         Behavior: Behavior normal.        "  Thought Content: Thought content normal.         Judgment: Judgment normal.                 HEALTH RISK ASSESSMENT    Smoking Status:  Social History     Tobacco Use   Smoking Status Never   Smokeless Tobacco Never     Alcohol Consumption:  Social History     Substance and Sexual Activity   Alcohol Use Yes    Comment: rarely     Fall Risk Screen:    HANNAH Fall Risk Assessment was completed, and patient is at MODERATE risk for falls. Assessment completed on:2024    Depression Screenin/13/2024     1:37 PM   PHQ-2/PHQ-9 Depression Screening   Little Interest or Pleasure in Doing Things 0-->not at all   Feeling Down, Depressed or Hopeless 0-->not at all   PHQ-9: Brief Depression Severity Measure Score 0       Health Habits and Functional and Cognitive Screenin/9/2024    10:12 AM   Functional & Cognitive Status   Do you have difficulty preparing food and eating? No   Do you have difficulty bathing yourself, getting dressed or grooming yourself? No   Do you have difficulty using the toilet? No   Do you have difficulty moving around from place to place? No   Do you have trouble with steps or getting out of a bed or a chair? No   Current Diet Well Balanced Diet   Dental Exam Up to date   Eye Exam Up to date   Exercise (times per week) 2 times per week   Current Exercises Include Walking   Do you need help using the phone?  No   Are you deaf or do you have serious difficulty hearing?  No   Do you need help to go to places out of walking distance? No   Do you need help shopping? No   Do you need help preparing meals?  No   Do you need help with housework?  Yes   Do you need help with laundry? No   Do you need help taking your medications? No   Do you need help managing money? No   Do you ever drive or ride in a car without wearing a seat belt? No   Have you felt unusual stress, anger or loneliness in the last month? No   Who do you live with? Alone   If you need help, do you have trouble finding  someone available to you? Yes   Have you been bothered in the last four weeks by sexual problems? No   Do you have difficulty concentrating, remembering or making decisions? No       Age-appropriate Screening Schedule:  Refer to the list below for future screening recommendations based on patient's age, sex and/or medical conditions. Orders for these recommended tests are listed in the plan section. The patient has been provided with a written plan.    Health Maintenance   Topic Date Due    Pneumococcal Vaccine 65+ (1 of 1 - PCV) Never done    LIPID PANEL  04/13/2024    COVID-19 Vaccine (6 - 2023-24 season) 05/15/2024 (Originally 3/14/2024)    RSV Vaccine - Adults (1 - 1-dose 60+ series) 06/13/2024 (Originally 10/23/2006)    ZOSTER VACCINE (1 of 2) 11/13/2024 (Originally 10/23/1996)    INFLUENZA VACCINE  08/01/2024    BMI FOLLOWUP  04/05/2025    ANNUAL WELLNESS VISIT  05/13/2025    DXA SCAN  06/13/2025    TDAP/TD VACCINES (2 - Td or Tdap) 12/22/2030    COLORECTAL CANCER SCREENING  10/12/2033    HEPATITIS C SCREENING  Completed              Assessment & Plan   This pleasant 78-year-old presents at this time for Medicare wellness review.  She relates she is feeling well having had no problems in the interim of visits.  She is still recovering from the loss of her  about a year or so ago.    Regarding anticipatory guidance.  We discussed the importance of keeping her LDL cholesterol less than 100.  Her LDL from 1 year ago was 104.  Note is made she is picked up 6 pounds from last visit.  We gave her a low-cholesterol diet sheet to be implemented at our direction.    CMS Preventative Services Quick Reference  Risk Factors Identified During Encounter  None Identified  The above risks/problems have been discussed with the patient.  Follow up actions/plans if indicated are seen below in the Assessment/Plan Section.  Pertinent information has been shared with the patient in the After Visit Summary.    Diagnoses and  all orders for this visit:    1. Primary hypertension (Primary)  -     Comprehensive Metabolic Panel    2. Hyperlipidemia, unspecified hyperlipidemia type  -     Lipid Panel With / Chol / HDL Ratio    3. Iron deficiency anemia, unspecified iron deficiency anemia type  -     CBC & Differential      Plan:  1.)  Follow-up in 5 to 6 months for hypertension          Follow Up:   No follow-ups on file.     An After Visit Summary and PPPS were made available to the patient.

## 2024-05-14 LAB
ALBUMIN SERPL-MCNC: 4.4 G/DL (ref 3.5–5.2)
ALBUMIN/GLOB SERPL: 1.6 G/DL
ALP SERPL-CCNC: 81 U/L (ref 39–117)
ALT SERPL-CCNC: 15 U/L (ref 1–33)
AST SERPL-CCNC: 18 U/L (ref 1–32)
BASOPHILS # BLD AUTO: 0.03 10*3/MM3 (ref 0–0.2)
BASOPHILS NFR BLD AUTO: 0.8 % (ref 0–1.5)
BILIRUB SERPL-MCNC: 0.5 MG/DL (ref 0–1.2)
BUN SERPL-MCNC: 18 MG/DL (ref 8–23)
BUN/CREAT SERPL: 20.2 (ref 7–25)
CALCIUM SERPL-MCNC: 9.5 MG/DL (ref 8.6–10.5)
CHLORIDE SERPL-SCNC: 105 MMOL/L (ref 98–107)
CHOLEST SERPL-MCNC: 189 MG/DL (ref 0–200)
CHOLEST/HDLC SERPL: 2.17 {RATIO}
CO2 SERPL-SCNC: 26.2 MMOL/L (ref 22–29)
CREAT SERPL-MCNC: 0.89 MG/DL (ref 0.57–1)
EGFRCR SERPLBLD CKD-EPI 2021: 66.9 ML/MIN/1.73
EOSINOPHIL # BLD AUTO: 0.07 10*3/MM3 (ref 0–0.4)
EOSINOPHIL NFR BLD AUTO: 1.9 % (ref 0.3–6.2)
ERYTHROCYTE [DISTWIDTH] IN BLOOD BY AUTOMATED COUNT: 11.1 % (ref 12.3–15.4)
GLOBULIN SER CALC-MCNC: 2.7 GM/DL
GLUCOSE SERPL-MCNC: 90 MG/DL (ref 65–99)
HCT VFR BLD AUTO: 33.1 % (ref 34–46.6)
HDLC SERPL-MCNC: 87 MG/DL (ref 40–60)
HGB BLD-MCNC: 10.8 G/DL (ref 12–15.9)
IMM GRANULOCYTES # BLD AUTO: 0.01 10*3/MM3 (ref 0–0.05)
IMM GRANULOCYTES NFR BLD AUTO: 0.3 % (ref 0–0.5)
LDLC SERPL CALC-MCNC: 93 MG/DL (ref 0–100)
LYMPHOCYTES # BLD AUTO: 1.14 10*3/MM3 (ref 0.7–3.1)
LYMPHOCYTES NFR BLD AUTO: 30.9 % (ref 19.6–45.3)
MCH RBC QN AUTO: 30.7 PG (ref 26.6–33)
MCHC RBC AUTO-ENTMCNC: 32.6 G/DL (ref 31.5–35.7)
MCV RBC AUTO: 94 FL (ref 79–97)
MONOCYTES # BLD AUTO: 0.53 10*3/MM3 (ref 0.1–0.9)
MONOCYTES NFR BLD AUTO: 14.4 % (ref 5–12)
NEUTROPHILS # BLD AUTO: 1.91 10*3/MM3 (ref 1.7–7)
NEUTROPHILS NFR BLD AUTO: 51.7 % (ref 42.7–76)
NRBC BLD AUTO-RTO: 0 /100 WBC (ref 0–0.2)
PLATELET # BLD AUTO: 226 10*3/MM3 (ref 140–450)
POTASSIUM SERPL-SCNC: 4.4 MMOL/L (ref 3.5–5.2)
PROT SERPL-MCNC: 7.1 G/DL (ref 6–8.5)
RBC # BLD AUTO: 3.52 10*6/MM3 (ref 3.77–5.28)
SODIUM SERPL-SCNC: 142 MMOL/L (ref 136–145)
TRIGL SERPL-MCNC: 47 MG/DL (ref 0–150)
VLDLC SERPL CALC-MCNC: 9 MG/DL (ref 5–40)
WBC # BLD AUTO: 3.69 10*3/MM3 (ref 3.4–10.8)

## 2024-05-15 ENCOUNTER — HOSPITAL ENCOUNTER (OUTPATIENT)
Dept: PHYSICAL THERAPY | Facility: HOSPITAL | Age: 79
Discharge: HOME | End: 2024-05-15
Admitting: OBSTETRICS & GYNECOLOGY
Payer: MEDICARE

## 2024-05-15 DIAGNOSIS — R35.1 NOCTURIA: ICD-10-CM

## 2024-05-15 DIAGNOSIS — N39.41 URGE INCONTINENCE OF URINE: Primary | ICD-10-CM

## 2024-05-15 PROCEDURE — 97112 NEUROMUSCULAR REEDUCATION: CPT

## 2024-05-15 PROCEDURE — 97110 THERAPEUTIC EXERCISES: CPT

## 2024-05-15 NOTE — THERAPY TREATMENT NOTE
Outpatient Physical Therapy Pelvic Health Treatment Note  [facility]     Patient Name: Max Bonds  : 1945  MRN: [id number]  Today's Date: 5/15/2024        Visit Date: 05/15/2024    Visit Dx:    ICD-10-CM ICD-9-CM   1. Urge incontinence of urine  N39.41 788.31   2. Nocturia  R35.1 788.43       Patient Active Problem List   Diagnosis    Screening for malignant neoplasm of colon                          PT Assessment/Plan       Row Name 05/15/24 1000          PT Assessment    Assessment Comments Pt returns for first f/u since eval, reports improvements with nocturia and urinary leakage at night. Only getting up about once to use bathroom at night and only once episode of night leakage since eval. Reports has stopped drinking cherry juice before bed and limits water, which has helped along with HEP. Also reports urinary frequency during the day time has improved. Initiated pelvic floor strenth/endurance/coordination exercises today and updated HEP. Pt remains candidate for skilled PT.  -CC        PT Plan    PT Plan Comments Next visit: f/u nocturia and urinary frequency. Response to HEP. Add STS with pelvic floor, bridge with pelvic floor, side steps, row with pelvic floor, clamshells? Anticipate d/c at end of remaining visits if cont improvement in s/s.  -CC               User Key  (r) = Recorded By, (t) = Taken By, (c) = Cosigned By      Initials Name Provider Type    Marisa Shine, PT Physical Therapist                       OP Exercises       Row Name 05/15/24 1000             Subjective    Subjective Comments Has only had one instance of nocturia/incontinence since previous visit, urgency has decreased.  -CC         Total Minutes    84733 - PT Therapeutic Exercise Minutes 20  -CC      20812 -  PT Neuromuscular Reeducation Minutes 10  -CC         Exercise 1    Exercise Name 1 diaphragmatic breathing with PF length and contract  -CC      Cueing 1 Verbal;Tactile  -CC      Reps 1 15   "-CC      Time 1 supine  -CC         Exercise 2    Exercise Name 2 hip ER str EOB  -CC      Cueing 2 Demo;Verbal  -CC      Reps 2 3 bobby  -CC      Time 2 30 sec  -CC         Exercise 3    Exercise Name 3 butterfly stretch  -CC      Cueing 3 Verbal  -CC      Time 3 3 min  -CC      Additional Comments with overpressure  -CC         Exercise 4    Exercise Name 4 kegel HL  -CC      Cueing 4 Verbal  -CC      Reps 4 10  -CC         Exercise 5    Exercise Name 5 Kegel HL endurnce  -CC      Cueing 5 Verbal  -CC      Reps 5 10  -CC      Time 5 5s  -CC         Exercise 6    Exercise Name 6 PPT with PFM  -CC      Cueing 6 Verbal  -CC      Sets 6 2  -CC      Reps 6 10  -CC      Time 6 5s  -CC         Exercise 7    Exercise Name 7 Hl hip add  -CC      Cueing 7 Verbal  -CC      Sets 7 2  -CC      Reps 7 10  -CC      Time 7 5s  -CC         Exercise 8    Exercise Name 8 Hl hip abd  -CC      Cueing 8 Verbal  -CC      Sets 8 3  -CC      Reps 8 10  -CC      Time 8 RTB  -CC                User Key  (r) = Recorded By, (t) = Taken By, (c) = Cosigned By      Initials Name Provider Type    CC Marisa Pino, PT Physical Therapist                                 PT OP Goals       Row Name 05/15/24 1000          PT Short Term Goals    STG Date to Achieve 05/24/24  -CC     STG 1 Patient will be independent in the performance of a home program of PFM exercises on a daily basis to increase muscle strength and recruitment as measured with digital palpation in order to increase continence control.  -CC     STG 1 Progress Met  -CC     STG 2 Patient will report at least 50% decrease in urinary incontinence episodes per voiding log in order to improve participation in work and social activities.  -CC     STG 2 Progress Ongoing  -CC     STG 3 Patient will demonstrate use of functional PFM contraction by performing a \"pelvic brace\" or \"knack\" in order to reduce or eliminate UI during a cough.  -CC     STG 3 Progress Ongoing  -CC        Long Term " Goals    LTG Date to Achieve 06/23/24  -     LTG 1 Pt will be independent with comprehensive HEP to address core/hip/PFM strength and coordination for self-management of condition  -CC     LTG 1 Progress Ongoing  -CC     LTG 2 Pt will report </=1-2 night wakings to urinate.  -CC     LTG 2 Progress Ongoing;Progressing  -CC     LTG 2 Progress Comments only one instance of nocturia since previous visit and only gettinig up once per night to go to the bathroom  -CC     LTG 3 Patient will demonstrate an increase in PFM endurance  to consistent 10 second hold x 10 repetitions.  -CC     LTG 3 Progress Ongoing  -     LTG 3 Progress Comments progressed to 5s today  -               User Key  (r) = Recorded By, (t) = Taken By, (c) = Cosigned By      Initials Name Provider Type    CC Marisa Pino, PT Physical Therapist                                    Time Calculation:   Start Time: 1045  Stop Time: 1115  Time Calculation (min): 30 min  Total Timed Code Minutes- PT: 30 minute(s)  Timed Charges  97834 - PT Therapeutic Exercise Minutes: 20  44793 -  PT Neuromuscular Reeducation Minutes: 10  Total Minutes  Timed Charges Total Minutes: 30   Total Minutes: 30  Therapy Charges for Today       Code Description Service Date Service Provider Modifiers Qty    66487869856 HC PT NEUROMUSC RE EDUCATION EA 15 MIN 5/15/2024 Marisa Pino, PT GP 1    84831696147 HC PT THER PROC EA 15 MIN 5/15/2024 Marisa Pino, PT GP 1                      Marisa Pino PT  5/15/2024

## 2024-05-16 DIAGNOSIS — I10 ESSENTIAL HYPERTENSION: ICD-10-CM

## 2024-05-17 ENCOUNTER — PATIENT ROUNDING (BHMG ONLY) (OUTPATIENT)
Dept: FAMILY MEDICINE CLINIC | Facility: CLINIC | Age: 79
End: 2024-05-17
Payer: MEDICARE

## 2024-05-17 RX ORDER — AMLODIPINE BESYLATE 10 MG/1
TABLET ORAL
Qty: 90 TABLET | Refills: 0 | Status: SHIPPED | OUTPATIENT
Start: 2024-05-17

## 2024-05-22 ENCOUNTER — HOSPITAL ENCOUNTER (OUTPATIENT)
Dept: PHYSICAL THERAPY | Facility: HOSPITAL | Age: 79
Discharge: HOME | End: 2024-05-22
Admitting: OBSTETRICS & GYNECOLOGY
Payer: MEDICARE

## 2024-05-22 DIAGNOSIS — R35.1 NOCTURIA: ICD-10-CM

## 2024-05-22 DIAGNOSIS — N39.41 URGE INCONTINENCE OF URINE: Primary | ICD-10-CM

## 2024-05-22 PROCEDURE — 97110 THERAPEUTIC EXERCISES: CPT

## 2024-05-22 PROCEDURE — 97530 THERAPEUTIC ACTIVITIES: CPT

## 2024-05-22 NOTE — THERAPY PROGRESS REPORT/RE-CERT
Outpatient Physical Therapy Pelvic Health Progress Note  [facility]     Patient Name: Max Bonds  : 1945  MRN: [id number]  Today's Date: 2024        Visit Date: 2024    Visit Dx:    ICD-10-CM ICD-9-CM   1. Urge incontinence of urine  N39.41 788.31   2. Nocturia  R35.1 788.43       Patient Active Problem List   Diagnosis    Screening for malignant neoplasm of colon                          PT Assessment/Plan       Row Name 24 1000          PT Assessment    Functional Limitations Limitation in home management;Limitations in community activities;Limitations in functional capacity and performance;Performance in leisure activities;Performance in self-care ADL  -CC     Impairments Muscle strength;Impaired muscle length;Impaired muscle power;Impaired muscle endurance;Coordination;Impaired flexibility  -CC     Assessment Comments Max Bonds has been seen for 3 physical therapy sessions for nocturia/UI.  Treatment has included therapeutic exercise, therapeutic activity, neuro-muscular retraining , and patient education with home exercise program . Progress to physical therapy goals is excellent. Pt has met 3/3 STG and 1/3 LTG. She reports improving endurance with PFM, but still some difficulty/fatigue. Reports biggest remaining issues is feelings of incomplete emptying, so we discussed toileting techniques/exhale/squatty potty today to address. She will benefit from continued skilled physical therapy to address remaining impairments and functional limitations.  -CC     Please refer to paper survey for additional self-reported information No  -CC     Rehab Potential Good  -CC     Patient/caregiver participated in establishment of treatment plan and goals Yes  -CC     Patient would benefit from skilled therapy intervention Yes  -CC        PT Plan    PT Frequency 1x/week  -CC     Predicted Duration of Therapy Intervention (PT) 1-3 more visits  -CC     PT Plan Comments Next  visit: f/u on stool and exhale/leaning forward with urinating for feelings of incomplete emptying  -CC               User Key  (r) = Recorded By, (t) = Taken By, (c) = Cosigned By      Initials Name Provider Type    CC Marisa Pino, PT Physical Therapist                       OP Exercises       Row Name 05/22/24 1000             Subjective    Subjective Comments UI improved 75%, only gettiing up 1-2x in middle of night to pee and no UI at night, does feel like she has issues with incomplete emptying  -CC         Total Minutes    53625 - PT Therapeutic Exercise Minutes 28  -CC      85032 - PT Therapeutic Activity Minutes 10  -CC         Exercise 1    Exercise Name 1 s/l clam w PFM  -CC      Cueing 1 Verbal  -CC      Sets 1 2 ea leg  -CC      Reps 1 10  -CC         Exercise 2    Exercise Name 2 STS w PFM  -CC      Cueing 2 Verbal  -CC      Sets 2 2  -CC      Reps 2 10  -CC      Time 2 elevated mat d/t knee pain  -CC         Exercise 3    Exercise Name 3 seated kegel  -CC      Cueing 3 Verbal  -CC      Reps 3 10  -CC         Exercise 4    Exercise Name 4 bridge with PFM  -CC      Cueing 4 Verbal  -CC      Sets 4 2  -CC      Reps 4 10  -CC         Exercise 5    Exercise Name 5 Kegel HL endurnce  -CC      Cueing 5 Verbal  -CC      Reps 5 10  -CC      Time 5 10s  -CC         Exercise 6    Exercise Name 6 PPT with PFM  -CC      Cueing 6 Verbal  -CC      Sets 6 2  -CC      Reps 6 10  -CC      Time 6 5s  -CC         Exercise 7    Exercise Name 7 Hl hip add  -CC      Cueing 7 Verbal  -CC      Sets 7 2  -CC      Reps 7 10  -CC      Time 7 5s  -CC         Exercise 8    Exercise Name 8 Hl hip abd  -CC      Cueing 8 Verbal  -CC      Sets 8 3  -CC      Reps 8 10  -CC      Time 8 RTB  -CC         Exercise 9    Exercise Name 9 education on stool/leaning forward/exhale for complete emptying  -CC      Cueing 9 Verbal;Demo  -CC                User Key  (r) = Recorded By, (t) = Taken By, (c) = Cosigned By      Initials Name  "Provider Type    Marisa Shine, MARA Physical Therapist                                 PT OP Goals       Row Name 05/22/24 1000          PT Short Term Goals    STG Date to Achieve 05/24/24  -CC     STG 1 Patient will be independent in the performance of a home program of PFM exercises on a daily basis to increase muscle strength and recruitment as measured with digital palpation in order to increase continence control.  -CC     STG 1 Progress Met  -CC     STG 2 Patient will report at least 50% decrease in urinary incontinence episodes per voiding log in order to improve participation in work and social activities.  -CC     STG 2 Progress Met  -CC     STG 2 Progress Comments 75% improvement  -CC     STG 3 Patient will demonstrate use of functional PFM contraction by performing a \"pelvic brace\" or \"knack\" in order to reduce or eliminate UI during a cough.  -CC     STG 3 Progress Met  -        Long Term Goals    LTG Date to Achieve 06/23/24  -CC     LTG 1 Pt will be independent with comprehensive HEP to address core/hip/PFM strength and coordination for self-management of condition  -CC     LTG 1 Progress Ongoing  -CC     LTG 2 Pt will report </=1-2 night wakings to urinate.  -CC     LTG 2 Progress Met  -CC     LTG 3 Patient will demonstrate an increase in PFM endurance  to consistent 10 second hold x 10 repetitions.  -     LTG 3 Progress Ongoing  -               User Key  (r) = Recorded By, (t) = Taken By, (c) = Cosigned By      Initials Name Provider Type    Marisa Shine, MARA Physical Therapist                                    Time Calculation:   Start Time: 1044  Stop Time: 1122  Time Calculation (min): 38 min  Total Timed Code Minutes- PT: 38 minute(s)  Timed Charges  13451 - PT Therapeutic Exercise Minutes: 28  35287 - PT Therapeutic Activity Minutes: 10  Total Minutes  Timed Charges Total Minutes: 38   Total Minutes: 38  Therapy Charges for Today       Code Description Service Date " Service Provider Modifiers Qty    02711629413  PT THER PROC EA 15 MIN 5/22/2024 Marisa Pino, PT GP 2    72679979521 HC PT THERAPEUTIC ACT EA 15 MIN 5/22/2024 Marisa Pino, PT GP 1                      Marisa Pino, PT  5/22/2024

## 2024-05-29 ENCOUNTER — HOSPITAL ENCOUNTER (OUTPATIENT)
Dept: PHYSICAL THERAPY | Facility: HOSPITAL | Age: 79
Setting detail: THERAPIES SERIES
Discharge: HOME | End: 2024-05-29
Payer: MEDICARE

## 2024-05-29 DIAGNOSIS — N39.41 URGE INCONTINENCE OF URINE: Primary | ICD-10-CM

## 2024-05-29 DIAGNOSIS — R35.1 NOCTURIA: ICD-10-CM

## 2024-05-29 PROCEDURE — 97530 THERAPEUTIC ACTIVITIES: CPT

## 2024-05-29 NOTE — THERAPY DISCHARGE NOTE
Outpatient Physical Therapy Pelvic Health Treatment Note/Discharge Summary  [facility]     Patient Name: Max Bonds  : 1945  MRN: [id number]  Today's Date: 2024        Visit Date: 2024    Visit Dx:    ICD-10-CM ICD-9-CM   1. Urge incontinence of urine  N39.41 788.31   2. Nocturia  R35.1 788.43       Patient Active Problem List   Diagnosis    Screening for malignant neoplasm of colon         Pelvic Health       Row Name 24 1200             Education Provided On:    Education Points Behavioral modifications;HEP  -CC      Method of Delivery Verbal;Demonstration;Written  -CC      Education Provided To Patient  -CC      Level of Understanding Teach back education performed;Verbalized;Demonstrated  -CC      HEP Comments reprinted original HEP  -CC                User Key  (r) = Recorded By, (t) = Taken By, (c) = Cosigned By      Initials Name Provider Type    Marisa Shine, PT Physical Therapist                                  PT Assessment/Plan       Row Name 24 1200          PT Assessment    Assessment Comments Max Bonds was seen for 4 physical therapy sessions for nocturia/urinary incontinence.  Treatment included therapeutic exercise, therapeutic activity, neuro-muscular retraining , and patient education with home exercise program . Progress to physical therapy goals was excellent. Pt met 3/3 STG and 3/3 LTG. She was discharged to an independent HEP and provided patient education to self-manage condition  -CC        PT Plan    PT Plan Comments discharged  -CC               User Key  (r) = Recorded By, (t) = Taken By, (c) = Cosigned By      Initials Name Provider Type    Marisa Shine, PT Physical Therapist                         OP Exercises       Row Name 24 1200             Subjective    Subjective Comments Reports improved feelings of incomplete emptying with forward lean  -CC         Total Minutes    25387 - PT Therapeutic  "Activity Minutes 15  -CC         Exercise 1    Exercise Name 1 Verbal review of HEP to answer pt questions, discuss frequency, discuss long term maintence, reprinted handout  -CC      Cueing 1 Verbal  -CC         Exercise 9    Exercise Name 9 brief review of education on stool/leaning forward/exhale for complete emptying  -CC      Cueing 9 Verbal;Demo  -CC                User Key  (r) = Recorded By, (t) = Taken By, (c) = Cosigned By      Initials Name Provider Type    Marisa Shine, PT Physical Therapist                                 PT OP Goals       Row Name 05/29/24 1100          PT Short Term Goals    STG Date to Achieve 05/24/24  -CC     STG 1 Patient will be independent in the performance of a home program of PFM exercises on a daily basis to increase muscle strength and recruitment as measured with digital palpation in order to increase continence control.  -CC     STG 1 Progress Met  -CC     STG 2 Patient will report at least 50% decrease in urinary incontinence episodes per voiding log in order to improve participation in work and social activities.  -CC     STG 2 Progress Met  -CC     STG 3 Patient will demonstrate use of functional PFM contraction by performing a \"pelvic brace\" or \"knack\" in order to reduce or eliminate UI during a cough.  -CC     STG 3 Progress Met  -CC        Long Term Goals    LTG Date to Achieve 06/23/24  -CC     LTG 1 Pt will be independent with comprehensive HEP to address core/hip/PFM strength and coordination for self-management of condition  -CC     LTG 1 Progress Met  -CC     LTG 2 Pt will report </=1-2 night wakings to urinate.  -     LTG 2 Progress Met  -CC     LTG 3 Patient will demonstrate an increase in PFM endurance  to consistent 10 second hold x 10 repetitions.  -     LTG 3 Progress Met  -CC               User Key  (r) = Recorded By, (t) = Taken By, (c) = Cosigned By      Initials Name Provider Type    Marisa Shine, PT Physical Therapist      "                             Time Calculation:   Start Time: 1130  Stop Time: 1145  Time Calculation (min): 15 min  Total Timed Code Minutes- PT: 15 minute(s)  Timed Charges  31930 - PT Therapeutic Activity Minutes: 15  Total Minutes  Timed Charges Total Minutes: 15   Total Minutes: 15    Therapy Charges for Today       Code Description Service Date Service Provider Modifiers Qty    33024180101 HC PT THERAPEUTIC ACT EA 15 MIN 5/29/2024 Marisa Pino, PT GP 1                        Marisa Pino, PT  5/29/2024

## 2024-08-12 DIAGNOSIS — I10 ESSENTIAL HYPERTENSION: ICD-10-CM

## 2024-08-12 DIAGNOSIS — I10 ESSENTIAL HYPERTENSION, BENIGN: ICD-10-CM

## 2024-08-12 RX ORDER — HYDRALAZINE HYDROCHLORIDE 25 MG/1
25 TABLET, FILM COATED ORAL 2 TIMES DAILY
Qty: 180 TABLET | Refills: 0 | Status: SHIPPED | OUTPATIENT
Start: 2024-08-12

## 2024-08-12 RX ORDER — AMLODIPINE BESYLATE 10 MG/1
TABLET ORAL
Qty: 90 TABLET | Refills: 0 | Status: SHIPPED | OUTPATIENT
Start: 2024-08-12

## 2024-09-03 RX ORDER — TRIAMCINOLONE ACETONIDE 1 MG/G
1 CREAM TOPICAL 2 TIMES DAILY
Qty: 30 G | Refills: 1 | Status: SHIPPED | OUTPATIENT
Start: 2024-09-03

## 2024-11-13 ENCOUNTER — OFFICE VISIT (OUTPATIENT)
Dept: FAMILY MEDICINE CLINIC | Facility: CLINIC | Age: 79
End: 2024-11-13
Payer: MEDICARE

## 2024-11-13 VITALS
HEART RATE: 68 BPM | OXYGEN SATURATION: 98 % | WEIGHT: 149.2 LBS | HEIGHT: 62 IN | BODY MASS INDEX: 27.46 KG/M2 | DIASTOLIC BLOOD PRESSURE: 78 MMHG | SYSTOLIC BLOOD PRESSURE: 142 MMHG

## 2024-11-13 DIAGNOSIS — I10 ESSENTIAL HYPERTENSION: Primary | ICD-10-CM

## 2024-11-13 DIAGNOSIS — I10 ESSENTIAL HYPERTENSION, BENIGN: ICD-10-CM

## 2024-11-13 DIAGNOSIS — I10 ESSENTIAL HYPERTENSION: ICD-10-CM

## 2024-11-13 PROCEDURE — 1125F AMNT PAIN NOTED PAIN PRSNT: CPT | Performed by: INTERNAL MEDICINE

## 2024-11-13 PROCEDURE — 99213 OFFICE O/P EST LOW 20 MIN: CPT | Performed by: INTERNAL MEDICINE

## 2024-11-13 RX ORDER — LATANOPROSTENE BUNOD 0.24 MG/ML
SOLUTION/ DROPS OPHTHALMIC
COMMUNITY
Start: 2024-04-01

## 2024-11-13 NOTE — PROGRESS NOTES
11/13/2024    Assessment & Plan     This pleasant 79-year-old presents at this time for follow-up of hypertension.  She relates she is feeling well having had no problems in the interim of visits.  She was recently seen by ophthalmology and she relates that there was a change made in her medication which is #1 working with more effective this now and #2 having less side effects.  Her weight is essentially unchanged from 6 months ago but is up from 7 months.  She lost her  several months or so ago and is still adjusting to the change.    Her blood pressure was initially 142/78 on recheck by me in the left arm sitting position standard cuff it was 120/80.  She is on 3 antihypertensive medications including hydralazine 25 mg 1 tab p.o. twice daily amlodipine 10 mg p.o. daily, and lisinopril 40 mg 1 tab p.o. daily.  Physical examination was unremarkable.      We reviewed her lipid level from 6 months ago which was showed a LDL of 93.  Diagnoses and all orders for this visit:    1. Essential hypertension (Primary)           Plan:  1.)  Continue current medication  2.)  Follow-up in 4 to 5 months.       CC: Hypertension (Follow up )  .        HPI  Hypertension  Pertinent negatives include no blurred vision, chest pain, palpitations or shortness of breath.        Subjective   Max Bonds is a 79 y.o. female.      The following portions of the patient's history were reviewed and updated as appropriate: allergies, current medications, past family history, past medical history, past social history, past surgical history, and problem list.    Problem List  Patient Active Problem List   Diagnosis    Screening for malignant neoplasm of colon       Past Medical History  Past Medical History:   Diagnosis Date    Allergic     SEASONAL    Anemia     Arthritis     Cataract     Diverticulosis     Glaucoma     Hypertension     Osteopenia     Osteoporosis     Urgency of urination        Surgical History  Past Surgical  History:   Procedure Laterality Date    BREAST CYST EXCISION      CATARACT EXTRACTION, BILATERAL Bilateral     COLONOSCOPY      COLONOSCOPY N/A 10/12/2023    Procedure: COLONOSCOPY to cecum;  Surgeon: Maximo Waller MD;  Location: [facility] ENDOSCOPY;  Service: General;  Laterality: N/A;  pre- fam hx colon CA  post- diverticulosis    EYE SURGERY      HYSTERECTOMY         Family History  Family History   Problem Relation Age of Onset    Alzheimer's disease Mother     Hypertension Mother     Heart disease Mother     Hypertension Father     Diabetes Father     Colon cancer Father     Colon cancer Paternal Aunt     Diabetes Paternal Uncle     Breast cancer Cousin     Malig Hyperthermia Neg Hx        Social History  Social History    Tobacco Use      Smoking status: Never      Smokeless tobacco: Never       Is the Patient a current tobacco user? No    Allergies  Allergies   Allergen Reactions    Aspirin Other (See Comments)     UPSETS STOMACH    Penicillins Other (See Comments)     RED AND SWOLLEN AT INJECTION SITE       Current Medications    Current Outpatient Medications:     amLODIPine (NORVASC) 10 MG tablet, Take 1 tablet by mouth once daily, Disp: 90 tablet, Rfl: 0    dorzolamide-timolol (COSOPT) 22.3-6.8 MG/ML ophthalmic solution, INSTILL 1 DROP INTO EACH EYE TWICE DAILY, Disp: , Rfl:     hydrALAZINE (APRESOLINE) 25 MG tablet, Take 1 tablet by mouth twice daily, Disp: 180 tablet, Rfl: 0    lisinopril (PRINIVIL,ZESTRIL) 40 MG tablet, Take 1 tablet by mouth Daily., Disp: 90 tablet, Rfl: 3    Rhopressa 0.02 % solution, INSTILL 1 DROP INTO EACH EYE EVERY DAY AT BEDTIME, Disp: , Rfl:     triamcinolone (KENALOG) 0.1 % cream, Apply 1 Application topically to the appropriate area as directed 2 (Two) Times a Day., Disp: 30 g, Rfl: 1    Vyzulta 0.024 % solution, , Disp: , Rfl:     brimonidine (ALPHAGAN P) 0.1 % solution ophthalmic solution, Every 8 (Eight) Hours., Disp: , Rfl:     LORazepam (ATIVAN) 0.5 MG tablet, Take 1  tablet by mouth At Night As Needed (1 tablet at bed as needed). for anxiety, Disp: 30 tablet, Rfl: 0     Review of System  Review of Systems   Eyes:  Negative for blurred vision.   Respiratory:  Negative for shortness of breath.    Cardiovascular:  Negative for chest pain and palpitations.     I have reviewed and confirmed the accuracy of the ROS as documented by the MA/LPN/RN Chintan Townsend MD    Vitals:    11/13/24 1135   BP: 142/78   Pulse: 68   SpO2: 98%     Body mass index is 27.28 kg/m².    Objective     Physical Exam  Physical Exam  Constitutional:       General: She is not in acute distress.     Appearance: Normal appearance.   HENT:      Head: Normocephalic and atraumatic.   Cardiovascular:      Rate and Rhythm: Normal rate and regular rhythm.   Pulmonary:      Effort: Pulmonary effort is normal. No respiratory distress.      Breath sounds: Normal breath sounds. No wheezing, rhonchi or rales.   Neurological:      General: No focal deficit present.      Mental Status: She is alert and oriented to person, place, and time.   Psychiatric:         Mood and Affect: Mood normal.         Behavior: Behavior normal.         Thought Content: Thought content normal.         Judgment: Judgment normal.             Chintan Townsend MD  11/13/2024  Answers submitted by the patient for this visit:  Primary Reason for Visit (Submitted on 11/11/2024)  What is the primary reason for your visit?: High Blood Pressure

## 2024-11-14 RX ORDER — HYDRALAZINE HYDROCHLORIDE 25 MG/1
25 TABLET, FILM COATED ORAL 2 TIMES DAILY
Qty: 180 TABLET | Refills: 0 | Status: SHIPPED | OUTPATIENT
Start: 2024-11-14

## 2024-11-14 RX ORDER — AMLODIPINE BESYLATE 10 MG/1
TABLET ORAL
Qty: 90 TABLET | Refills: 0 | Status: SHIPPED | OUTPATIENT
Start: 2024-11-14

## 2024-11-17 DIAGNOSIS — I10 ESSENTIAL HYPERTENSION: ICD-10-CM

## 2024-11-18 RX ORDER — LISINOPRIL 40 MG/1
40 TABLET ORAL DAILY
Qty: 90 TABLET | Refills: 0 | Status: SHIPPED | OUTPATIENT
Start: 2024-11-18

## 2025-02-08 DIAGNOSIS — I10 ESSENTIAL HYPERTENSION, BENIGN: ICD-10-CM

## 2025-02-08 DIAGNOSIS — I10 ESSENTIAL HYPERTENSION: ICD-10-CM

## 2025-02-10 RX ORDER — HYDRALAZINE HYDROCHLORIDE 25 MG/1
25 TABLET, FILM COATED ORAL 2 TIMES DAILY
Qty: 180 TABLET | Refills: 0 | Status: SHIPPED | OUTPATIENT
Start: 2025-02-10

## 2025-02-10 RX ORDER — LISINOPRIL 40 MG/1
40 TABLET ORAL DAILY
Qty: 90 TABLET | Refills: 0 | Status: SHIPPED | OUTPATIENT
Start: 2025-02-10

## 2025-03-03 ENCOUNTER — TELEPHONE (OUTPATIENT)
Dept: OBSTETRICS AND GYNECOLOGY | Facility: CLINIC | Age: 80
End: 2025-03-03
Payer: MEDICARE

## 2025-03-03 DIAGNOSIS — Z78.0 POST-MENOPAUSAL: Primary | ICD-10-CM

## 2025-03-03 NOTE — TELEPHONE ENCOUNTER
Patient notified that referral has been placed and C will call her directly. If she doesn't hear anything by the end of the week, please let me know.

## 2025-03-13 ENCOUNTER — OFFICE VISIT (OUTPATIENT)
Dept: FAMILY MEDICINE CLINIC | Facility: CLINIC | Age: 80
End: 2025-03-13
Payer: MEDICARE

## 2025-03-13 VITALS
SYSTOLIC BLOOD PRESSURE: 128 MMHG | HEIGHT: 62 IN | WEIGHT: 149 LBS | HEART RATE: 69 BPM | DIASTOLIC BLOOD PRESSURE: 82 MMHG | OXYGEN SATURATION: 99 % | BODY MASS INDEX: 27.42 KG/M2

## 2025-03-13 DIAGNOSIS — I10 ESSENTIAL HYPERTENSION, BENIGN: Primary | ICD-10-CM

## 2025-03-13 PROCEDURE — 1125F AMNT PAIN NOTED PAIN PRSNT: CPT | Performed by: INTERNAL MEDICINE

## 2025-03-13 PROCEDURE — 99213 OFFICE O/P EST LOW 20 MIN: CPT | Performed by: INTERNAL MEDICINE

## 2025-03-13 RX ORDER — DORZOLAMIDE HCL 20 MG/ML
1 SOLUTION/ DROPS OPHTHALMIC EVERY 8 HOURS
COMMUNITY

## 2025-03-13 NOTE — PROGRESS NOTES
03/13/2025    Summarization of Visit         Assessment & Plan  1. Hypertension.  Her blood pressure readings have been consistently borderline, with the last reading at 142/80 and a previous reading of 142/78. She is currently on amlodipine 10 mg, hydralazine 25 mg twice a day, and lisinopril 40 mg. The current medication regimen will be maintained without any changes. She has been advised to increase her physical activity, which could potentially lower her blood pressure. She reports feeling okay and has been walking inside the house but plans to join a senior exercise program for more structured activity.    Follow-up  The patient will follow up in 3 months.    Results      Plan:  The 1.) follow-up in 1 month for Medicare wellness review and reevaluation of blood pressure           CC: Hypertension (F/U---no other issues)  .        HPI  Hypertension  Pertinent negatives include no chest pain or neck pain.      History of Present Illness  The patient presents for evaluation of blood pressure.    She reports a satisfactory health status with no significant issues. She has been adhering to her prescribed medication regimen, which includes amlodipine 10 mg, hydralazine 25 mg twice daily, and lisinopril 40 mg. Her dietary habits are generally healthy, although she acknowledges occasional indulgence in high-sodium foods. She maintains an active lifestyle through indoor walking exercises and expresses interest in joining a senior exercise program. She also reports a weight loss of 1 pound over the past week.    MEDICATIONS  Amlodipine, hydralazine, lisinopril.    Subjective   Max Bonds is a 79 y.o. female.      The following portions of the patient's history were reviewed and updated as appropriate: allergies, current medications, past family history, past medical history, past social history, past surgical history, and problem list.    Problem List  Patient Active Problem List   Diagnosis    Screening for  malignant neoplasm of colon       Past Medical History  Past Medical History:   Diagnosis Date    Allergic     SEASONAL    Anemia     Arthritis     Cataract     Diverticulosis     Glaucoma     Hypertension     Osteopenia     Osteoporosis     Urgency of urination        Surgical History  Past Surgical History:   Procedure Laterality Date    BREAST CYST EXCISION      CATARACT EXTRACTION, BILATERAL Bilateral     COLONOSCOPY      COLONOSCOPY N/A 10/12/2023    Procedure: COLONOSCOPY to cecum;  Surgeon: Maximo Waller MD;  Location: [facility] ENDOSCOPY;  Service: General;  Laterality: N/A;  pre- fam hx colon CA  post- diverticulosis    EYE SURGERY      HYSTERECTOMY         Family History  Family History   Problem Relation Age of Onset    Alzheimer's disease Mother     Hypertension Mother     Heart disease Mother     Hypertension Father     Diabetes Father     Colon cancer Father     Colon cancer Paternal Aunt     Diabetes Paternal Uncle     Breast cancer Cousin     Maljolene Hyperthermia Neg Hx        Social History  Social History    Tobacco Use      Smoking status: Never      Smokeless tobacco: Never       Is the Patient a current tobacco user? No    Allergies  Allergies   Allergen Reactions    Aspirin Other (See Comments)     UPSETS STOMACH    Penicillins Other (See Comments)     RED AND SWOLLEN AT INJECTION SITE       Current Medications    Current Outpatient Medications:     amLODIPine (NORVASC) 10 MG tablet, Take 1 tablet by mouth once daily, Disp: 90 tablet, Rfl: 0    dorzolamide (TRUSOPT) 2 % ophthalmic solution, Apply 1 drop to eye(s) as directed by provider Every 8 (Eight) Hours., Disp: , Rfl:     hydrALAZINE (APRESOLINE) 25 MG tablet, Take 1 tablet by mouth twice daily, Disp: 180 tablet, Rfl: 0    lisinopril (PRINIVIL,ZESTRIL) 40 MG tablet, Take 1 tablet by mouth once daily, Disp: 90 tablet, Rfl: 0    Rhopressa 0.02 % solution, INSTILL 1 DROP INTO EACH EYE EVERY DAY AT BEDTIME, Disp: , Rfl:     triamcinolone  (KENALOG) 0.1 % cream, Apply 1 Application topically to the appropriate area as directed 2 (Two) Times a Day., Disp: 30 g, Rfl: 1    Vyzulta 0.024 % solution, , Disp: , Rfl:      Review of System  Review of Systems   Constitutional:  Negative for chills.   HENT:  Positive for congestion. Negative for sore throat.    Respiratory:  Positive for cough.    Cardiovascular:  Negative for chest pain.   Gastrointestinal:  Negative for abdominal pain and nausea.   Musculoskeletal:  Positive for myalgias. Negative for neck pain.   Skin:  Negative for rash.   Neurological:  Negative for weakness and numbness.     I have reviewed and confirmed the accuracy of the ROS as documented by the MA/LPN/RN Chintan Tonwsend MD    Vitals:    03/13/25 1145   BP: 128/82   Pulse: 69   SpO2: 99%     Body mass index is 27.25 kg/m².    Objective     Physical Exam  Physical Exam  Constitutional:       General: She is not in acute distress.     Appearance: Normal appearance.   HENT:      Head: Normocephalic and atraumatic.   Cardiovascular:      Rate and Rhythm: Normal rate and regular rhythm.   Pulmonary:      Effort: Pulmonary effort is normal. No respiratory distress.      Breath sounds: Normal breath sounds. No wheezing, rhonchi or rales.   Neurological:      General: No focal deficit present.      Mental Status: She is alert and oriented to person, place, and time.   Psychiatric:         Mood and Affect: Mood normal.         Behavior: Behavior normal.         Thought Content: Thought content normal.         Judgment: Judgment normal.           Patient or patient representative verbalized consent for the use of Ambient Listening during the visit with  Chintan Townsend MD for chart documentation. 3/13/2025  12:37 EDT    Chintan Townsend MD  03/13/2025

## 2025-04-07 ENCOUNTER — PROCEDURE VISIT (OUTPATIENT)
Dept: OBSTETRICS AND GYNECOLOGY | Facility: CLINIC | Age: 80
End: 2025-04-07
Payer: MEDICARE

## 2025-04-07 DIAGNOSIS — Z12.31 VISIT FOR SCREENING MAMMOGRAM: Primary | ICD-10-CM

## 2025-04-07 PROCEDURE — 77063 BREAST TOMOSYNTHESIS BI: CPT | Performed by: OBSTETRICS & GYNECOLOGY

## 2025-04-07 PROCEDURE — 77067 SCR MAMMO BI INCL CAD: CPT | Performed by: OBSTETRICS & GYNECOLOGY

## 2025-05-15 ENCOUNTER — OFFICE VISIT (OUTPATIENT)
Dept: FAMILY MEDICINE CLINIC | Facility: CLINIC | Age: 80
End: 2025-05-15
Payer: OTHER GOVERNMENT

## 2025-05-15 VITALS
OXYGEN SATURATION: 99 % | WEIGHT: 149 LBS | SYSTOLIC BLOOD PRESSURE: 128 MMHG | HEIGHT: 62 IN | HEART RATE: 66 BPM | BODY MASS INDEX: 27.42 KG/M2 | DIASTOLIC BLOOD PRESSURE: 80 MMHG

## 2025-05-15 DIAGNOSIS — I10 ESSENTIAL HYPERTENSION, BENIGN: ICD-10-CM

## 2025-05-15 DIAGNOSIS — D50.9 IRON DEFICIENCY ANEMIA, UNSPECIFIED IRON DEFICIENCY ANEMIA TYPE: ICD-10-CM

## 2025-05-15 DIAGNOSIS — I10 ESSENTIAL HYPERTENSION: ICD-10-CM

## 2025-05-15 DIAGNOSIS — I10 PRIMARY HYPERTENSION: Primary | ICD-10-CM

## 2025-05-15 DIAGNOSIS — E78.5 HYPERLIPIDEMIA, UNSPECIFIED HYPERLIPIDEMIA TYPE: ICD-10-CM

## 2025-05-15 RX ORDER — AMLODIPINE BESYLATE 10 MG/1
10 TABLET ORAL DAILY
Qty: 90 TABLET | Refills: 0 | Status: SHIPPED | OUTPATIENT
Start: 2025-05-15

## 2025-05-15 RX ORDER — LISINOPRIL 40 MG/1
40 TABLET ORAL DAILY
Qty: 90 TABLET | Refills: 0 | Status: SHIPPED | OUTPATIENT
Start: 2025-05-15

## 2025-05-15 RX ORDER — HYDRALAZINE HYDROCHLORIDE 25 MG/1
25 TABLET, FILM COATED ORAL 2 TIMES DAILY
Qty: 180 TABLET | Refills: 0 | Status: SHIPPED | OUTPATIENT
Start: 2025-05-15

## 2025-05-15 NOTE — PROGRESS NOTES
Subjective   The ABCs of the Annual Wellness Visit  Medicare Wellness Visit      Max Bonds is a 79 y.o. patient who presents for a Medicare Wellness Visit.    The following portions of the patient's history were reviewed and   updated as appropriate: allergies, current medications, past family history, past medical history, past social history, past surgical history, and problem list.    Compared to one year ago, the patient's physical   health is the same.  Compared to one year ago, the patient's mental   health is the same.    Recent Hospitalizations:  She was not admitted to the hospital during the last year.     Current Medical Providers:  Patient Care Team:  Chintan Townsend MD as PCP - General (Internal Medicine)    Outpatient Medications Prior to Visit   Medication Sig Dispense Refill    amLODIPine (NORVASC) 10 MG tablet Take 1 tablet by mouth once daily 90 tablet 0    dorzolamide (TRUSOPT) 2 % ophthalmic solution Apply 1 drop to eye(s) as directed by provider Every 8 (Eight) Hours.      hydrALAZINE (APRESOLINE) 25 MG tablet Take 1 tablet by mouth twice daily 180 tablet 0    lisinopril (PRINIVIL,ZESTRIL) 40 MG tablet Take 1 tablet by mouth once daily 90 tablet 0    Rhopressa 0.02 % solution INSTILL 1 DROP INTO EACH EYE EVERY DAY AT BEDTIME      triamcinolone (KENALOG) 0.1 % cream Apply 1 Application topically to the appropriate area as directed 2 (Two) Times a Day. 30 g 1    Vyzulta 0.024 % solution        No facility-administered medications prior to visit.     No opioid medication identified on active medication list. I have reviewed chart for other potential  high risk medication/s and harmful drug interactions in the elderly.      Aspirin is not on active medication list.  Aspirin use is not indicated based on review of current medical condition/s. Risk of harm outweighs potential benefits.  .    Patient Active Problem List   Diagnosis    Screening for malignant neoplasm of colon     Advance  "Care Planning Advance Directive is not on file.  ACP discussion was held with the patient during this visit. Patient does not have an advance directive, information provided.            Objective   Vitals:    05/15/25 1315   BP: 128/80   Pulse: 66   SpO2: 99%   Weight: 67.6 kg (149 lb)   Height: 157.5 cm (62\")   PainSc: 0-No pain       Estimated body mass index is 27.25 kg/m² as calculated from the following:    Height as of this encounter: 157.5 cm (62\").    Weight as of this encounter: 67.6 kg (149 lb).    BMI is >= 25 and <30. (Overweight) The following options were offered after discussion;: exercise counseling/recommendations           Does the patient have evidence of cognitive impairment? No                                                                                                Health  Risk Assessment    Smoking Status:  Social History     Tobacco Use   Smoking Status Never   Smokeless Tobacco Never     Alcohol Consumption:  Social History     Substance and Sexual Activity   Alcohol Use Yes    Comment: rarely       Fall Risk Screen  STEADI Fall Risk Assessment was completed, and patient is at LOW risk for falls.Assessment completed on:5/15/2025    Depression Screening   Little interest or pleasure in doing things? Not at all   Feeling down, depressed, or hopeless? Not at all   PHQ-2 Total Score 0      Health Habits and Functional and Cognitive Screenin/8/2025    10:22 AM   Functional & Cognitive Status   Do you have difficulty preparing food and eating? No   Do you have difficulty bathing yourself, getting dressed or grooming yourself? No   Do you have difficulty using the toilet? No   Do you have difficulty moving around from place to place? No   Do you have trouble with steps or getting out of a bed or a chair? No   Current Diet Well Balanced Diet   Dental Exam Up to date   Eye Exam Up to date   Exercise (times per week) 3 times per week   Current Exercises Include Walking   Do you need help " using the phone?  No   Are you deaf or do you have serious difficulty hearing?  No   Do you need help to go to places out of walking distance? No   Do you need help shopping? No   Do you need help preparing meals?  No   Do you need help with housework?  Yes   Do you need help with laundry? No   Do you need help taking your medications? No   Do you need help managing money? No   Do you ever drive or ride in a car without wearing a seat belt? No   Have you felt unusual stress, anger or loneliness in the last month? No   Who do you live with? Alone   If you need help, do you have trouble finding someone available to you? Yes   Have you been bothered in the last four weeks by sexual problems? No   Do you have difficulty concentrating, remembering or making decisions? No           Age-appropriate Screening Schedule:  Refer to the list below for future screening recommendations based on patient's age, sex and/or medical conditions. Orders for these recommended tests are listed in the plan section. The patient has been provided with a written plan.    Health Maintenance List  Health Maintenance   Topic Date Due    ZOSTER VACCINE (1 of 2) Never done    RSV Vaccine - Adults (1 - 1-dose 75+ series) Never done    COVID-19 Vaccine (6 - 2024-25 season) 09/01/2024    LIPID PANEL  05/14/2025    DXA SCAN  06/13/2025    INFLUENZA VACCINE  07/01/2025    ANNUAL WELLNESS VISIT  05/15/2026    TDAP/TD VACCINES (2 - Td or Tdap) 12/22/2030    COLORECTAL CANCER SCREENING  10/12/2033    HEPATITIS C SCREENING  Completed    Pneumococcal Vaccine 50+  Completed    MAMMOGRAM  Discontinued                                                                                                                                                CMS Preventative Services Quick Reference  Risk Factors Identified During Encounter  None Identified    The above risks/problems have been discussed with the patient.  Pertinent information has been shared with the  "patient in the After Visit Summary.  An After Visit Summary and PPPS were made available to the patient.    Follow Up:   Next Medicare Wellness visit to be scheduled in 1 year.         Additional E&M Note during same encounter follows:  Patient has additional, significant, and separately identifiable condition(s)/problem(s) that require work above and beyond the Medicare Wellness Visit     Chief Complaint  Medicare Wellness-subsequent    Subjective   HPI      Review of Systems   Constitutional:  Negative for chills and fever.   Respiratory:  Negative for cough and shortness of breath.    Cardiovascular:  Negative for chest pain and palpitations.   Gastrointestinal:  Negative for constipation, diarrhea, nausea and vomiting.   Neurological:  Negative for dizziness.              Objective   Vital Signs:  /80   Pulse 66   Ht 157.5 cm (62\")   Wt 67.6 kg (149 lb)   SpO2 99%   BMI 27.25 kg/m²   Physical Exam  Constitutional:       General: She is not in acute distress.     Appearance: Normal appearance.   HENT:      Head: Normocephalic and atraumatic.   Cardiovascular:      Rate and Rhythm: Normal rate and regular rhythm.   Pulmonary:      Effort: Pulmonary effort is normal. No respiratory distress.      Breath sounds: Normal breath sounds. No wheezing, rhonchi or rales.   Neurological:      General: No focal deficit present.      Mental Status: She is alert and oriented to person, place, and time.   Psychiatric:         Mood and Affect: Mood normal.         Behavior: Behavior normal.         Thought Content: Thought content normal.         Judgment: Judgment normal.              Assessment and Plan  This was not 79-year-old presents today for Medicare wellness review.  She relates she is feeling fine having had no problems in the interim of visits.  Her blood pressure shows good control today at 128/80 in the left arm sitting position standard cuff.    We discussed the importance of keeping her LDL cholesterol " less than 100.  Review of her labs shows that at Her LDL from 1 year ago showed a level of 93 with a triglyceride of 47 and a total cholesterol of 189.  She denies any chest pain or shortness of breath.    We discussed with her the importance of regular physical exercise and she will use as a goal to try to get 30 minutes of exercise 3 days a week.             Primary hypertension    Hyperlipidemia, unspecified hyperlipidemia type     Iron deficiency anemia, unspecified iron deficiency anemia type    No orders of the defined types were placed in this encounter.            Follow Up   No follow-ups on file.  Patient was given instructions and counseling regarding her condition or for health maintenance advice. Please see specific information pulled into the AVS if appropriate.

## 2025-05-16 LAB
ALBUMIN SERPL-MCNC: 4.1 G/DL (ref 3.5–5.2)
ALBUMIN/GLOB SERPL: 1.4 G/DL
ALP SERPL-CCNC: 84 U/L (ref 39–117)
ALT SERPL-CCNC: 12 U/L (ref 1–33)
AST SERPL-CCNC: 16 U/L (ref 1–32)
BILIRUB SERPL-MCNC: 0.5 MG/DL (ref 0–1.2)
BUN SERPL-MCNC: 19 MG/DL (ref 8–23)
BUN/CREAT SERPL: 18.4 (ref 7–25)
CALCIUM SERPL-MCNC: 9.7 MG/DL (ref 8.6–10.5)
CHLORIDE SERPL-SCNC: 105 MMOL/L (ref 98–107)
CHOLEST SERPL-MCNC: 182 MG/DL (ref 0–200)
CO2 SERPL-SCNC: 27.1 MMOL/L (ref 22–29)
CREAT SERPL-MCNC: 1.03 MG/DL (ref 0.57–1)
EGFRCR SERPLBLD CKD-EPI 2021: 55.8 ML/MIN/1.73
GLOBULIN SER CALC-MCNC: 2.9 GM/DL
GLUCOSE SERPL-MCNC: 83 MG/DL (ref 65–99)
HDLC SERPL-MCNC: 84 MG/DL (ref 40–60)
LDLC SERPL CALC-MCNC: 89 MG/DL (ref 0–100)
POTASSIUM SERPL-SCNC: 4.5 MMOL/L (ref 3.5–5.2)
PROT SERPL-MCNC: 7 G/DL (ref 6–8.5)
SODIUM SERPL-SCNC: 142 MMOL/L (ref 136–145)
TRIGL SERPL-MCNC: 46 MG/DL (ref 0–150)
VLDLC SERPL CALC-MCNC: 9 MG/DL (ref 5–40)

## 2025-05-17 LAB
BASOPHILS # BLD AUTO: 0.03 10*3/MM3 (ref 0–0.2)
BASOPHILS NFR BLD AUTO: 0.7 % (ref 0–1.5)
EOSINOPHIL # BLD AUTO: 0.1 10*3/MM3 (ref 0–0.4)
EOSINOPHIL NFR BLD AUTO: 2.4 % (ref 0.3–6.2)
ERYTHROCYTE [DISTWIDTH] IN BLOOD BY AUTOMATED COUNT: 11.3 % (ref 12.3–15.4)
HCT VFR BLD AUTO: 35.1 % (ref 34–46.6)
HGB BLD-MCNC: 11 G/DL (ref 12–15.9)
IMM GRANULOCYTES # BLD AUTO: 0.01 10*3/MM3 (ref 0–0.05)
IMM GRANULOCYTES NFR BLD AUTO: 0.2 % (ref 0–0.5)
LYMPHOCYTES # BLD AUTO: 1.54 10*3/MM3 (ref 0.7–3.1)
LYMPHOCYTES NFR BLD AUTO: 36.5 % (ref 19.6–45.3)
MCH RBC QN AUTO: 30.9 PG (ref 26.6–33)
MCHC RBC AUTO-ENTMCNC: 31.3 G/DL (ref 31.5–35.7)
MCV RBC AUTO: 98.6 FL (ref 79–97)
MONOCYTES # BLD AUTO: 0.58 10*3/MM3 (ref 0.1–0.9)
MONOCYTES NFR BLD AUTO: 13.7 % (ref 5–12)
NEUTROPHILS # BLD AUTO: 1.96 10*3/MM3 (ref 1.7–7)
NEUTROPHILS NFR BLD AUTO: 46.5 % (ref 42.7–76)
NRBC BLD AUTO-RTO: 0 /100 WBC (ref 0–0.2)
PLATELET # BLD AUTO: 226 10*3/MM3 (ref 140–450)
RBC # BLD AUTO: 3.56 10*6/MM3 (ref 3.77–5.28)
WBC # BLD AUTO: 4.22 10*3/MM3 (ref 3.4–10.8)

## 2025-05-19 ENCOUNTER — RESULTS FOLLOW-UP (OUTPATIENT)
Dept: FAMILY MEDICINE CLINIC | Facility: CLINIC | Age: 80
End: 2025-05-19
Payer: OTHER GOVERNMENT

## 2025-05-19 NOTE — LETTER
Max Bonds  [address]   [place] [address]    May 19, 2025     Dear MsFredy Jordan Vamshi:    Below are the results from your recent visit:    Resulted Orders   Comprehensive Metabolic Panel   Result Value Ref Range    Glucose 83 65 - 99 mg/dL    BUN 19 8 - 23 mg/dL    Creatinine 1.03 (H) 0.57 - 1.00 mg/dL    EGFR Result 55.8 (L) >60.0 mL/min/1.73      Comment:      GFR Categories in Chronic Kidney Disease (CKD)  GFR Category          GFR (mL/min/1.73)    Interpretation  G1                    90 or greater        Normal or high  (1)  G2                    60-89                Mild decrease  (1)  G3a                   45-59                Mild to moderate  decrease  G3b                   30-44                Moderate to  severe decrease  G4                    15-29                Severe decrease  G5                    14 or less           Kidney failure  (1)In the absence of evidence of kidney disease, neither  GFR category G1 or G2 fulfill the criteria for CKD.  eGFR calculation 2021 CKD-EPI creatinine equation, which  does not include race as a factor      BUN/Creatinine Ratio 18.4 7.0 - 25.0    Sodium 142 136 - 145 mmol/L    Potassium 4.5 3.5 - 5.2 mmol/L    Chloride 105 98 - 107 mmol/L    Total CO2 27.1 22.0 - 29.0 mmol/L    Calcium 9.7 8.6 - 10.5 mg/dL    Total Protein 7.0 6.0 - 8.5 g/dL    Albumin 4.1 3.5 - 5.2 g/dL    Globulin 2.9 gm/dL    A/G Ratio 1.4 g/dL    Total Bilirubin 0.5 0.0 - 1.2 mg/dL    Alkaline Phosphatase 84 39 - 117 U/L    AST (SGOT) 16 1 - 32 U/L    ALT (SGPT) 12 1 - 33 U/L   CBC & Differential   Result Value Ref Range    WBC 4.22 3.40 - 10.80 10*3/mm3    RBC 3.56 (L) 3.77 - 5.28 10*6/mm3    Hemoglobin 11.0 (L) 12.0 - 15.9 g/dL    Hematocrit 35.1 34.0 - 46.6 %    MCV 98.6 (H) 79.0 - 97.0 fL    MCH 30.9 26.6 - 33.0 pg    MCHC 31.3 (L) 31.5 - 35.7 g/dL    RDW 11.3 (L) 12.3 - 15.4 %    Platelets 226 140 - 450 10*3/mm3    Neutrophil Rel % 46.5 42.7 - 76.0 %    Lymphocyte Rel %  36.5 19.6 - 45.3 %    Monocyte Rel % 13.7 (H) 5.0 - 12.0 %    Eosinophil Rel % 2.4 0.3 - 6.2 %    Basophil Rel % 0.7 0.0 - 1.5 %    Neutrophils Absolute 1.96 1.70 - 7.00 10*3/mm3    Lymphocytes Absolute 1.54 0.70 - 3.10 10*3/mm3    Monocytes Absolute 0.58 0.10 - 0.90 10*3/mm3    Eosinophils Absolute 0.10 0.00 - 0.40 10*3/mm3    Basophils Absolute 0.03 0.00 - 0.20 10*3/mm3    Immature Granulocyte Rel % 0.2 0.0 - 0.5 %    Immature Grans Absolute 0.01 0.00 - 0.05 10*3/mm3    nRBC 0.0 0.0 - 0.2 /100 WBC   Lipid Panel   Result Value Ref Range    Total Cholesterol 182 0 - 200 mg/dL      Comment:      Cholesterol Reference Ranges  (U.S. Department of Health and Human Services ATP III  Classifications)  Desirable          <200 mg/dL  Borderline High    200-239 mg/dL  High Risk          >240 mg/dL  Triglyceride Reference Ranges  (U.S. Department of Health and Human Services ATP III  Classifications)  Normal           <150 mg/dL  Borderline High  150-199 mg/dL  High             200-499 mg/dL  Very High        >500 mg/dL  HDL Reference Ranges  (U.S. Department of Health and Human Services ATP III  Classifications)  Low     <40 mg/dl (major risk factor for CHD)  High    >60 mg/dl ('negative' risk factor for CHD)  LDL Reference Ranges  (U.S. Department of Health and Human Services ATP III  Classifications)  Optimal          <100 mg/dL  Near Optimal     100-129 mg/dL  Borderline High  130-159 mg/dL  High             160-189 mg/dL  Very High        >189 mg/dL  LDL is calculated using the NIH LDL-C calculation.      Triglycerides 46 0 - 150 mg/dL    HDL Cholesterol 84 (H) 40 - 60 mg/dL    VLDL Cholesterol Wade 9 5 - 40 mg/dL    LDL Chol Calc (NIH) 89 0 - 100 mg/dL       Essentially normal results     If you have any questions or concerns, please don't hesitate to call.         Sincerely,        Chintan Townsend MD

## 2025-05-22 ENCOUNTER — PATIENT ROUNDING (BHMG ONLY) (OUTPATIENT)
Dept: FAMILY MEDICINE CLINIC | Facility: CLINIC | Age: 80
End: 2025-05-22
Payer: OTHER GOVERNMENT

## 2025-06-17 ENCOUNTER — HOSPITAL ENCOUNTER (OUTPATIENT)
Dept: PET IMAGING | Facility: HOSPITAL | Age: 80
Discharge: HOME | End: 2025-06-17
Admitting: OBSTETRICS & GYNECOLOGY
Payer: MEDICARE

## 2025-06-17 DIAGNOSIS — Z78.0 POST-MENOPAUSAL: ICD-10-CM

## 2025-06-17 PROCEDURE — 77080 DXA BONE DENSITY AXIAL: CPT

## 2025-08-15 DIAGNOSIS — I10 ESSENTIAL HYPERTENSION: ICD-10-CM

## 2025-08-15 DIAGNOSIS — I10 ESSENTIAL HYPERTENSION, BENIGN: ICD-10-CM

## 2025-08-15 RX ORDER — HYDRALAZINE HYDROCHLORIDE 25 MG/1
25 TABLET, FILM COATED ORAL 2 TIMES DAILY
Qty: 180 TABLET | Refills: 0 | Status: SHIPPED | OUTPATIENT
Start: 2025-08-15

## 2025-08-15 RX ORDER — AMLODIPINE BESYLATE 10 MG/1
10 TABLET ORAL DAILY
Qty: 90 TABLET | Refills: 0 | Status: SHIPPED | OUTPATIENT
Start: 2025-08-15

## 2025-08-15 RX ORDER — LISINOPRIL 40 MG/1
40 TABLET ORAL DAILY
Qty: 90 TABLET | Refills: 0 | Status: SHIPPED | OUTPATIENT
Start: 2025-08-15

## (undated) DEVICE — SENSR O2 OXIMAX FNGR A/ 18IN NONSTR

## (undated) DEVICE — ADAPT CLN BIOGUARD AIR/H2O DISP

## (undated) DEVICE — TUBING, SUCTION, 1/4" X 10', STRAIGHT: Brand: MEDLINE

## (undated) DEVICE — KT ORCA ORCAPOD DISP STRL

## (undated) DEVICE — CANN O2 ETCO2 FITS ALL CONN CO2 SMPL A/ 7IN DISP LF